# Patient Record
Sex: MALE | Race: OTHER | Employment: OTHER | URBAN - METROPOLITAN AREA
[De-identification: names, ages, dates, MRNs, and addresses within clinical notes are randomized per-mention and may not be internally consistent; named-entity substitution may affect disease eponyms.]

---

## 2018-11-20 ENCOUNTER — APPOINTMENT (OUTPATIENT)
Dept: CT IMAGING | Facility: HOSPITAL | Age: 28
End: 2018-11-20
Attending: EMERGENCY MEDICINE
Payer: MEDICAID

## 2018-11-20 ENCOUNTER — HOSPITAL ENCOUNTER (EMERGENCY)
Facility: HOSPITAL | Age: 28
Discharge: HOME | End: 2018-11-20
Attending: EMERGENCY MEDICINE
Payer: MEDICAID

## 2018-11-20 VITALS
BODY MASS INDEX: 21.97 KG/M2 | HEIGHT: 67 IN | WEIGHT: 140 LBS | TEMPERATURE: 98 F | OXYGEN SATURATION: 100 % | HEART RATE: 90 BPM | SYSTOLIC BLOOD PRESSURE: 132 MMHG | RESPIRATION RATE: 16 BRPM | DIASTOLIC BLOOD PRESSURE: 81 MMHG

## 2018-11-20 DIAGNOSIS — R51.9 NONINTRACTABLE HEADACHE, UNSPECIFIED CHRONICITY PATTERN, UNSPECIFIED HEADACHE TYPE: Primary | ICD-10-CM

## 2018-11-20 PROCEDURE — 70450 CT HEAD/BRAIN W/O DYE: CPT | Performed by: EMERGENCY MEDICINE

## 2018-11-20 PROCEDURE — 99284 EMERGENCY DEPT VISIT MOD MDM: CPT

## 2018-11-20 RX ORDER — ONDANSETRON 4 MG/1
4 TABLET, FILM COATED ORAL ONCE
Status: COMPLETED | OUTPATIENT
Start: 2018-11-20 | End: 2018-11-20

## 2018-11-20 RX ORDER — HYDROCODONE BITARTRATE AND ACETAMINOPHEN 5; 325 MG/1; MG/1
1 TABLET ORAL ONCE
Status: COMPLETED | OUTPATIENT
Start: 2018-11-20 | End: 2018-11-20

## 2018-11-21 NOTE — ED NOTES
Receiving facility notified of impending transfer. Spoke with [facility] DIANNE ZAMORA.  Awaiting [facility] arrival.

## 2018-11-21 NOTE — ED INITIAL ASSESSMENT (HPI)
Pt presents with headache and twitching, states [facility] would not give him pain medication, pt called EMS himself. States had recent flat bone sx at [facility].
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
## 2019-03-23 ENCOUNTER — HOSPITAL ENCOUNTER (EMERGENCY)
Facility: HOSPITAL | Age: 29
Discharge: HOME | End: 2019-03-23
Attending: EMERGENCY MEDICINE
Payer: MEDICAID

## 2019-03-23 VITALS
OXYGEN SATURATION: 96 % | SYSTOLIC BLOOD PRESSURE: 118 MMHG | HEIGHT: 68 IN | HEART RATE: 93 BPM | TEMPERATURE: 99 F | RESPIRATION RATE: 14 BRPM | BODY MASS INDEX: 30.31 KG/M2 | DIASTOLIC BLOOD PRESSURE: 97 MMHG | WEIGHT: 200 LBS

## 2019-03-23 DIAGNOSIS — F32.A DEPRESSION, UNSPECIFIED DEPRESSION TYPE: Primary | ICD-10-CM

## 2019-03-23 LAB
ANION GAP SERPL CALC-SCNC: 5 MMOL/L (ref 0–18)
BUN BLD-MCNC: 16 MG/DL (ref 7–18)
BUN/CREAT SERPL: 14.5 (ref 10–20)
CALCIUM BLD-MCNC: 9.5 MG/DL (ref 8.5–10.1)
CHLORIDE SERPL-SCNC: 109 MMOL/L (ref 98–107)
CO2 SERPL-SCNC: 26 MMOL/L (ref 21–32)
CREAT BLD-MCNC: 1.1 MG/DL (ref 0.7–1.3)
ETHANOL SERPL-MCNC: <3 MG/DL (ref ?–3)
GLUCOSE BLD-MCNC: 97 MG/DL (ref 70–99)
OSMOLALITY SERPL CALC.SUM OF ELEC: 291 MOSM/KG (ref 275–295)
POTASSIUM SERPL-SCNC: 4.6 MMOL/L (ref 3.5–5.1)
SODIUM SERPL-SCNC: 140 MMOL/L (ref 136–145)

## 2019-03-23 PROCEDURE — 80048 BASIC METABOLIC PNL TOTAL CA: CPT | Performed by: EMERGENCY MEDICINE

## 2019-03-23 PROCEDURE — 99285 EMERGENCY DEPT VISIT HI MDM: CPT

## 2019-03-23 PROCEDURE — 85025 COMPLETE CBC W/AUTO DIFF WBC: CPT | Performed by: EMERGENCY MEDICINE

## 2019-03-23 PROCEDURE — 80320 DRUG SCREEN QUANTALCOHOLS: CPT | Performed by: EMERGENCY MEDICINE

## 2019-03-23 PROCEDURE — 36415 COLL VENOUS BLD VENIPUNCTURE: CPT

## 2019-03-23 PROCEDURE — 85060 BLOOD SMEAR INTERPRETATION: CPT | Performed by: EMERGENCY MEDICINE

## 2019-03-24 ENCOUNTER — HOSPITAL ENCOUNTER (EMERGENCY)
Facility: HOSPITAL | Age: 29
Discharge: NURSING, REHAB OR LONG-TERM CARE | End: 2019-03-24
Attending: EMERGENCY MEDICINE
Payer: MEDICAID

## 2019-03-24 VITALS
WEIGHT: 200 LBS | TEMPERATURE: 98 F | BODY MASS INDEX: 31.39 KG/M2 | HEART RATE: 92 BPM | DIASTOLIC BLOOD PRESSURE: 92 MMHG | HEIGHT: 67 IN | RESPIRATION RATE: 22 BRPM | OXYGEN SATURATION: 96 % | SYSTOLIC BLOOD PRESSURE: 107 MMHG

## 2019-03-24 DIAGNOSIS — F32.A DEPRESSION, UNSPECIFIED DEPRESSION TYPE: Primary | ICD-10-CM

## 2019-03-24 PROCEDURE — 99285 EMERGENCY DEPT VISIT HI MDM: CPT

## 2019-03-24 RX ORDER — ECHINACEA PURPUREA EXTRACT 125 MG
1 TABLET ORAL
Status: DISCONTINUED | OUTPATIENT
Start: 2019-03-24 | End: 2019-03-24

## 2019-03-24 RX ORDER — ACETAMINOPHEN 325 MG/1
TABLET ORAL
Status: COMPLETED
Start: 2019-03-24 | End: 2019-03-24

## 2019-03-24 RX ORDER — ACETAMINOPHEN 325 MG/1
650 TABLET ORAL ONCE
Status: COMPLETED | OUTPATIENT
Start: 2019-03-24 | End: 2019-03-24
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
## 2019-03-24 NOTE — ED NOTES
Pt stated that he stayed outside of the n.h. For hours. Pt denied SI. Pt sts that the reason he said that he will cut his wrists was that they kept asking about the plan. Pt denied that he was going to act on what he said.

## 2019-03-24 NOTE — ED NOTES
Pt stated tat some of the CNAs are very rude. Pt stated that some of the CNAs tend to place his food according the clock while others just throw the food on the table. Pt also said that he only at at 1 o'clock.  Pt stated that they don't even wake him up if

## 2019-03-24 NOTE — ED NOTES
BLANKET LEFT IN ED 36.   BELONGINGS LABELED AND SECURITY NOTIFIED FOR .  DR. Andriy Root REPORT NOW AVAILABLE ON EMR, FAXED TO [address] [place] [address] St [phone] PER NH RN REQUEST

## 2019-03-24 NOTE — ED PROVIDER NOTES
Patient Seen in: [facility] AND [facility] Emergency Department    History   Patient presents with:  Eval-P (psychiatric)      HPI    Patient presents to the ED stating that he has had suicidal thoughts with occasional thoughts of cutting himself with a razor. No respiratory distress. Neurological: He is alert and oriented to person, place, and time. Skin: Skin is warm and dry. He is not diaphoretic. Psychiatric: He has a normal mood and affect.  His behavior is normal.   No active suicidal ideation   Nursi any recent pertinent discharge summaries, testing, and procedures and reviewed those reports. Complicating Factors: The patient already has does not have a problem list on file. to contribute to the complexity of this ED evaluation. ED Course:  The pa

## 2019-03-24 NOTE — ED PROVIDER NOTES
Patient Seen in: [facility] AND [facility] Emergency Department    History   Patient presents with:  Checkup    Stated Complaint:     HPI    32 yo male from [address]. He was seen earlier in the day after he called 911 with suicidal ideation.  He says that he did not Constitutional: He is oriented to person, place, and time. He appears well-developed and well-nourished. No distress. HENT:   Head: Normocephalic and atraumatic.    Mouth/Throat: Oropharynx is clear and moist.   Cranial deformity   Neck: Normal range of

## 2019-03-24 NOTE — ED NOTES
Spoke with [facility] personnel regarding pt. They were questioning the case workers name who cleared the pt. It was explained to this person that the pt was cleared by the ED physician to be discharged back to [facility].  Person stated \"ok\" and the conversation was

## 2019-03-24 NOTE — ED NOTES
Discharge instructions given to superior and patient. Patient to return to [facility]. Patient was cleared by psychiatrist.  Ambulance form provided to [facility].

## 2019-03-24 NOTE — ED NOTES
Patient had a bowel movement. Patient cleaned, pad changed and disposable brief changed.   Water given per patient request.  Patient is requesting nasal spray or nasal saline for chronic dry nose

## 2019-03-24 NOTE — ED NOTES
Patient sleeping at this time. No distress. Respirations are equal/nonlabored/spontaneous/regular rate and rhythm.   Will continue to monitor and wait for Psychiatric/LSW consult

## 2019-03-24 NOTE — ED PROVIDER NOTES
Patient seen by psychiatry Dr. Mellisa Aquino and cleared to return to nursing home. He is medically clear for return as well.

## 2019-03-24 NOTE — ED NOTES
Spoke with Andres Maldonado from [facility]. Advised that pt is not suicidal at this time and is being discharge back to [facility].

## 2019-03-24 NOTE — CM/SW NOTE
JUAN DANIEL dispatcher David patria stating [facility] is refusing to take patient back into facility. Spoke with DIANNE Pablo at [facility] and informed her Dr. Du Santoro has discharged patient and he is safe to return to [facility].   Per Pina Esteban she needs to speak with Cullen How

## 2019-03-24 NOTE — BH LEVEL OF CARE ASSESSMENT
Level of Care Assessment Note    General Questions  Why are you here?: Pt presents to ED after making suicidal statements to staff at his nursing home.    Precipitating Events: Pt made suicidal statements to staff at his nursing home and was brought to the details of how to kill yourself? (past 30 days): No  5b. Do you intend to carry out this plan? (past 30 days): No  6. Have you ever done anything, started to do anything, or prepared to do anything to end your life? (lifetime): No  7.  How long ago did you Behaviors: No    Mental Health Symptoms  Hallucination Type: No problems reported or observed  Delusions: No problems reported or observed  Depression Symptoms: Appetite change;Sleep disturbance  Depression Description: Pt reports issues with sleep and pato living environment a supportive place for recovery?: Yes  Describe: Pt lives in a SNF     Withdrawal Symptoms  History of Withdrawal Symptoms: Denies past symptoms  Last Withdrawal Episode: N/A  Current Withdrawal Symptoms: No    Compulsive Behaviors  Are Good  Judgment: Good  Thought Patterns  Clarity/Relevance: Coherent  Flow: Organized  Content: Ordinary  Level of Consciousness: Alert  Level of Consciousness: Alert  Behavior  Exhibited behavior: Participated; Appropriate to situation    Assessment Summary Treatment: No  Transferred: No    Primary Psychiatric Diagnosis  Depressive Disorders: Unspecified Depressive Disorder

## 2019-03-24 NOTE — ED NOTES
After 20 minutes on hold with NH,  Dr. Magdalena Smith spoke with charge nurse  Mirza Brooks at [facility] and informed that patient is cleared to return to [facility]. This RN gave [facility] nurse to nurse report,  Mirza Brooks verbalized understanding and denies any further questions.   Supe

## 2019-03-24 NOTE — ED INITIAL ASSESSMENT (HPI)
Pt was discharged recently form the ER. PT initially was brought in for SI, though pt was denying it. Pt said that he was feeling down and depressed. Pt is paralyzed in left side. Pt is blind.   Pt stated that when he returned back to [facility], they didn't want

## 2019-03-24 NOTE — CONSULTS
[facility]    PATIENT'S NAME: Veronika Joyce   ATTENDING PHYSICIAN: Pop Kerr MD   CONSULTING PHYSICIAN: Minerva Olivera MD   PATIENT ACCOUNT#:   [id number]    LOCATION:  [place] [address]  MEDICAL RECORD #:   [id number]       DATE OF physical therapy twice a week and is slowly getting better in baby steps. His brother said he has found a group of eye physicians in [place] that are working with a new technology of trying to connect a camera to the brain to give him sight.   His brother never tried to hurt or kill himself. He said he does not have the guts to go through with it, and that he also loves his self and his family too much. He also notes that, because of his Confucianism beliefs, he would never kill himself.   He said that most o [facility]      INITIAL ASSESSMENT:  This 60-year-old single white male is admitted to our emergency department because of reports of suicidality at the nursing home where he lives. He has told us repeatedly that he has no plans of harming himself.   He fee 12:57:58  [facility] [id number]  [facility]/

## 2019-03-24 NOTE — ED NOTES
Patient given bottle of nasal saline. Administration instructions provided to patient.   ED Charge RN and Bebeto Scott and Dr. Kirk Vaz attempting to arrange for disposition

## 2019-03-24 NOTE — ED NOTES
Spoke with Zenaida Roland, Chief Medical Officer @ [phone] from [id number] [place] [address] at [facility].  Explained to Dr. Mariaa Granaods that we were going to call the psychiatrist out, though this is not something that we do because this is the pt's second visit to the ED in one

## 2019-03-24 NOTE — ED NOTES
Patient pushed call light  ERT to bedside  Informed ERT this RN ordered nasal saline, however not stocked in ED and will need to come from inpatient pharmacy.
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
## 2019-03-25 LAB
BASOPHILS # BLD AUTO: 0.07 X10(3) UL (ref 0–0.2)
BASOPHILS NFR BLD AUTO: 0.7 %
DEPRECATED RDW RBC AUTO: 58.2 FL (ref 35.1–46.3)
EOSINOPHIL # BLD AUTO: 0.35 X10(3) UL (ref 0–0.7)
EOSINOPHIL NFR BLD AUTO: 3.4 %
ERYTHROCYTE [DISTWIDTH] IN BLOOD BY AUTOMATED COUNT: 24.8 % (ref 11–15)
HCT VFR BLD AUTO: 43.8 % (ref 39–53)
HGB BLD-MCNC: 13 G/DL (ref 13–17.5)
IMM GRANULOCYTES # BLD AUTO: 0.09 X10(3) UL (ref 0–1)
IMM GRANULOCYTES NFR BLD: 0.9 %
LYMPHOCYTES # BLD AUTO: 2.85 X10(3) UL (ref 1–4)
LYMPHOCYTES NFR BLD AUTO: 28.1 %
MCH RBC QN AUTO: 20.5 PG (ref 26–34)
MCHC RBC AUTO-ENTMCNC: 29.7 G/DL (ref 31–37)
MCV RBC AUTO: 69 FL (ref 80–100)
MONOCYTES # BLD AUTO: 0.57 X10(3) UL (ref 0.1–1)
MONOCYTES NFR BLD AUTO: 5.6 %
NEUTROPHILS # BLD AUTO: 6.22 X10 (3) UL (ref 1.5–7.7)
NEUTROPHILS # BLD AUTO: 6.22 X10(3) UL (ref 1.5–7.7)
NEUTROPHILS NFR BLD AUTO: 61.3 %
PLATELET # BLD AUTO: 286 10(3)UL (ref 150–450)
PLATELET MORPHOLOGY: NORMAL
RBC # BLD AUTO: 6.35 X10(6)UL (ref 4.3–5.7)
WBC # BLD AUTO: 10.2 X10(3) UL (ref 4–11)

## 2019-05-12 ENCOUNTER — HOSPITAL ENCOUNTER (EMERGENCY)
Facility: HOSPITAL | Age: 29
Discharge: HOME | End: 2019-05-13
Attending: EMERGENCY MEDICINE
Payer: MEDICAID

## 2019-05-12 VITALS
OXYGEN SATURATION: 95 % | WEIGHT: 219 LBS | HEART RATE: 84 BPM | BODY MASS INDEX: 34.37 KG/M2 | HEIGHT: 67 IN | SYSTOLIC BLOOD PRESSURE: 133 MMHG | DIASTOLIC BLOOD PRESSURE: 87 MMHG | RESPIRATION RATE: 20 BRPM | TEMPERATURE: 98 F

## 2019-05-12 DIAGNOSIS — R10.13 DYSPEPSIA: Primary | ICD-10-CM

## 2019-05-12 PROCEDURE — 80048 BASIC METABOLIC PNL TOTAL CA: CPT

## 2019-05-12 PROCEDURE — 99284 EMERGENCY DEPT VISIT MOD MDM: CPT

## 2019-05-12 PROCEDURE — C9113 INJ PANTOPRAZOLE SODIUM, VIA: HCPCS | Performed by: EMERGENCY MEDICINE

## 2019-05-12 PROCEDURE — 85025 COMPLETE CBC W/AUTO DIFF WBC: CPT | Performed by: EMERGENCY MEDICINE

## 2019-05-12 PROCEDURE — 80076 HEPATIC FUNCTION PANEL: CPT | Performed by: EMERGENCY MEDICINE

## 2019-05-12 PROCEDURE — 96374 THER/PROPH/DIAG INJ IV PUSH: CPT

## 2019-05-12 PROCEDURE — 83690 ASSAY OF LIPASE: CPT | Performed by: EMERGENCY MEDICINE

## 2019-05-12 PROCEDURE — 85025 COMPLETE CBC W/AUTO DIFF WBC: CPT

## 2019-05-12 PROCEDURE — 80048 BASIC METABOLIC PNL TOTAL CA: CPT | Performed by: EMERGENCY MEDICINE

## 2019-05-12 RX ORDER — METHYLPHENIDATE HYDROCHLORIDE 10 MG/1
10 TABLET ORAL DAILY
COMMUNITY
End: 2020-05-16

## 2019-05-12 RX ORDER — MAGNESIUM HYDROXIDE/ALUMINUM HYDROXICE/SIMETHICONE 120; 1200; 1200 MG/30ML; MG/30ML; MG/30ML
30 SUSPENSION ORAL ONCE
Status: COMPLETED | OUTPATIENT
Start: 2019-05-12 | End: 2019-05-12

## 2019-05-12 RX ORDER — GABAPENTIN 600 MG/1
600 TABLET ORAL EVERY 8 HOURS
COMMUNITY
End: 2020-05-16

## 2019-05-12 RX ORDER — SENNA AND DOCUSATE SODIUM 50; 8.6 MG/1; MG/1
1 TABLET, FILM COATED ORAL 2 TIMES DAILY
COMMUNITY
End: 2020-05-16

## 2019-05-12 RX ORDER — BACLOFEN 10 MG/1
10 TABLET ORAL 3 TIMES DAILY
COMMUNITY
End: 2020-05-16

## 2019-05-12 RX ORDER — BISACODYL 10 MG
10 SUPPOSITORY, RECTAL RECTAL DAILY PRN
COMMUNITY
End: 2020-05-16

## 2019-05-12 RX ORDER — AZELASTINE 1 MG/ML
2 SPRAY, METERED NASAL 2 TIMES DAILY
COMMUNITY
End: 2020-05-16

## 2019-05-12 RX ORDER — ONDANSETRON 4 MG/1
4 TABLET, ORALLY DISINTEGRATING ORAL EVERY 8 HOURS PRN
COMMUNITY
End: 2020-05-16

## 2019-05-12 RX ORDER — DIPHENHYDRAMINE HCL 25 MG
12.5 CAPSULE ORAL NIGHTLY PRN
COMMUNITY
End: 2020-05-16

## 2019-05-12 RX ORDER — MAGNESIUM OXIDE 400 MG (241.3 MG MAGNESIUM) TABLET
400 TABLET DAILY
COMMUNITY
End: 2020-05-16

## 2019-05-12 RX ORDER — SERTRALINE HYDROCHLORIDE 25 MG/1
200 TABLET, FILM COATED ORAL DAILY
COMMUNITY
End: 2020-05-16

## 2019-05-12 RX ORDER — ENOXAPARIN SODIUM 100 MG/ML
40 INJECTION SUBCUTANEOUS DAILY
COMMUNITY
End: 2020-05-16

## 2019-05-12 RX ORDER — LEVETIRACETAM 500 MG/1
500 TABLET ORAL 2 TIMES DAILY
COMMUNITY
End: 2020-05-16

## 2019-05-12 RX ORDER — NICOTINE 21 MG/24HR
1 PATCH, TRANSDERMAL 24 HOURS TRANSDERMAL EVERY 24 HOURS
COMMUNITY
End: 2020-05-16

## 2019-05-12 RX ORDER — ACETAMINOPHEN 325 MG/1
650 TABLET ORAL EVERY 4 HOURS PRN
COMMUNITY
End: 2020-05-16

## 2019-05-12 RX ORDER — HYDROCODONE BITARTRATE AND ACETAMINOPHEN 5; 325 MG/1; MG/1
1 TABLET ORAL EVERY 6 HOURS PRN
COMMUNITY
End: 2020-05-16

## 2019-05-12 RX ORDER — PHENOL 1.4 %
10 AEROSOL, SPRAY (ML) MUCOUS MEMBRANE EVERY EVENING
COMMUNITY
End: 2020-05-16

## 2019-05-12 RX ORDER — ARIPIPRAZOLE 5 MG/1
5 TABLET ORAL DAILY
COMMUNITY
End: 2020-05-16

## 2019-05-12 RX ORDER — MELATONIN
325 2 TIMES DAILY WITH MEALS
COMMUNITY
End: 2020-05-16

## 2019-05-12 RX ORDER — TRAZODONE HYDROCHLORIDE 100 MG/1
100 TABLET ORAL NIGHTLY
COMMUNITY
End: 2020-05-16

## 2019-05-13 NOTE — ED PROVIDER NOTES
Patient Seen in: [facility] AND [facility] Emergency Department    History   Patient presents with:  Epigastric Pain    Stated Complaint: Complaints of heartburn    HPI    69-year-old male resident of [address] [place] [address] with history of encephalomyelitis and hemiplegia who rep reactive to light. Neck: Normal range of motion. Neck supple. No stridor. No JVD. Cardiovascular: Normal rate, regular rhythm and intact distal pulses. Pulmonary/Chest: Effort normal. No respiratory distress.   Clear and equal breath sounds bilatera RAINBOW DRAW LIGHT GREEN   RAINBOW DRAW GOLD                MDM   After the GI cocktail Protonix patient states his symptoms have resolved. His vitals are stable. He states he would like to go back to his long-term care facility.   Labs are pending joe mathew

## 2019-05-13 NOTE — ED NOTES
Pt states he takes Norco for Pain control at [facility], but patient wants something more for pain control.

## 2019-05-19 ENCOUNTER — HOSPITAL ENCOUNTER (EMERGENCY)
Facility: HOSPITAL | Age: 29
Discharge: HOME | End: 2019-05-19
Attending: EMERGENCY MEDICINE
Payer: MEDICAID

## 2019-05-19 VITALS
RESPIRATION RATE: 16 BRPM | HEART RATE: 88 BPM | WEIGHT: 215 LBS | DIASTOLIC BLOOD PRESSURE: 74 MMHG | BODY MASS INDEX: 33.74 KG/M2 | SYSTOLIC BLOOD PRESSURE: 112 MMHG | TEMPERATURE: 98 F | HEIGHT: 67 IN | OXYGEN SATURATION: 96 %

## 2019-05-19 DIAGNOSIS — J06.9 VIRAL UPPER RESPIRATORY TRACT INFECTION: Primary | ICD-10-CM

## 2019-05-19 PROCEDURE — 99284 EMERGENCY DEPT VISIT MOD MDM: CPT

## 2019-05-19 RX ORDER — LORATADINE 10 MG/1
10 TABLET ORAL DAILY
Qty: 30 TABLET | Refills: 0 | Status: SHIPPED | OUTPATIENT
Start: 2019-05-19 | End: 2019-06-18

## 2019-05-19 RX ORDER — ONDANSETRON 4 MG/1
4 TABLET, FILM COATED ORAL ONCE
Status: COMPLETED | OUTPATIENT
Start: 2019-05-19 | End: 2019-05-19

## 2019-05-19 RX ORDER — DIPHENHYDRAMINE HCL 25 MG
50 CAPSULE ORAL ONCE
Status: COMPLETED | OUTPATIENT
Start: 2019-05-19 | End: 2019-05-19

## 2019-05-19 RX ORDER — HYDROCODONE BITARTRATE AND ACETAMINOPHEN 5; 325 MG/1; MG/1
1 TABLET ORAL EVERY 6 HOURS PRN
Qty: 12 TABLET | Refills: 0 | Status: SHIPPED | OUTPATIENT
Start: 2019-05-19 | End: 2020-05-16

## 2019-05-19 RX ORDER — HYDROCODONE BITARTRATE AND ACETAMINOPHEN 5; 325 MG/1; MG/1
1 TABLET ORAL ONCE
Status: COMPLETED | OUTPATIENT
Start: 2019-05-19 | End: 2019-05-19
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
## 2019-05-19 NOTE — ED PROVIDER NOTES
Patient Seen in: [facility] AND [facility] Emergency Department    History   Patient presents with:  Headache (neurologic)  Numbness      HPI    Patient presents to the ED complaining of congestion, runny nose and intermittent headache.   He states symptoms are m canals bilaterally   Eyes: Conjunctivae are normal. Right eye exhibits no discharge. Left eye exhibits no discharge. Neck: No tracheal deviation present. Cardiovascular: Normal rate and intact distal pulses.    Pulmonary/Chest: Effort normal. No stridor Plan     Clinical Impression:  Viral upper respiratory tract infection  (primary encounter diagnosis)    Disposition:  Discharge    Follow-up:  Kassandra Cancino  [address] & [facility]  [phone]    Schedule an appointmen

## 2019-06-01 ENCOUNTER — HOSPITAL ENCOUNTER (EMERGENCY)
Facility: HOSPITAL | Age: 29
Discharge: HOME | End: 2019-06-01
Attending: EMERGENCY MEDICINE
Payer: MEDICAID

## 2019-06-01 VITALS
HEIGHT: 67 IN | BODY MASS INDEX: 33.9 KG/M2 | WEIGHT: 216 LBS | RESPIRATION RATE: 16 BRPM | OXYGEN SATURATION: 99 % | HEART RATE: 77 BPM | TEMPERATURE: 99 F | SYSTOLIC BLOOD PRESSURE: 113 MMHG | DIASTOLIC BLOOD PRESSURE: 56 MMHG

## 2019-06-01 DIAGNOSIS — R20.2 PARESTHESIAS: Primary | ICD-10-CM

## 2019-06-01 PROCEDURE — 99283 EMERGENCY DEPT VISIT LOW MDM: CPT

## 2019-06-01 RX ORDER — GABAPENTIN 300 MG/1
300 CAPSULE ORAL ONCE
Status: COMPLETED | OUTPATIENT
Start: 2019-06-01 | End: 2019-06-01

## 2019-06-02 NOTE — ED INITIAL ASSESSMENT (HPI)
Numbness and tingling to left foot for the past week. States he has a sensation of ants crawling on his feet.
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
## 2019-06-06 ENCOUNTER — HOSPITAL ENCOUNTER (EMERGENCY)
Facility: HOSPITAL | Age: 29
Discharge: HOME | End: 2019-06-07
Attending: EMERGENCY MEDICINE
Payer: MEDICAID

## 2019-06-06 VITALS
SYSTOLIC BLOOD PRESSURE: 113 MMHG | RESPIRATION RATE: 20 BRPM | OXYGEN SATURATION: 94 % | WEIGHT: 216.06 LBS | DIASTOLIC BLOOD PRESSURE: 70 MMHG | TEMPERATURE: 98 F | BODY MASS INDEX: 34 KG/M2 | HEART RATE: 96 BPM

## 2019-06-06 DIAGNOSIS — R20.2 PARESTHESIAS: Primary | ICD-10-CM

## 2019-06-06 PROCEDURE — 99283 EMERGENCY DEPT VISIT LOW MDM: CPT

## 2019-06-06 RX ORDER — GABAPENTIN 300 MG/1
300 CAPSULE ORAL ONCE
Status: COMPLETED | OUTPATIENT
Start: 2019-06-06 | End: 2019-06-06

## 2019-06-07 NOTE — ED INITIAL ASSESSMENT (HPI)
Pt from [facility], c/o left foot pain/numbness, states he feels \"ants crawling. \" He was recently here for same problem. CMS intact.
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
## 2019-06-08 ENCOUNTER — HOSPITAL ENCOUNTER (EMERGENCY)
Facility: HOSPITAL | Age: 29
Discharge: NURSING, REHAB OR LONG-TERM CARE | End: 2019-06-09
Attending: EMERGENCY MEDICINE
Payer: MEDICAID

## 2019-06-08 DIAGNOSIS — R20.2 PARESTHESIAS: Primary | ICD-10-CM

## 2019-06-08 PROCEDURE — 99283 EMERGENCY DEPT VISIT LOW MDM: CPT

## 2019-06-09 VITALS
BODY MASS INDEX: 33.74 KG/M2 | SYSTOLIC BLOOD PRESSURE: 121 MMHG | TEMPERATURE: 98 F | OXYGEN SATURATION: 97 % | RESPIRATION RATE: 16 BRPM | WEIGHT: 215 LBS | HEIGHT: 67 IN | DIASTOLIC BLOOD PRESSURE: 71 MMHG | HEART RATE: 100 BPM

## 2019-06-09 PROCEDURE — 94640 AIRWAY INHALATION TREATMENT: CPT

## 2019-06-09 RX ORDER — IPRATROPIUM BROMIDE AND ALBUTEROL SULFATE 2.5; .5 MG/3ML; MG/3ML
3 SOLUTION RESPIRATORY (INHALATION) ONCE
Status: COMPLETED | OUTPATIENT
Start: 2019-06-09 | End: 2019-06-09

## 2019-06-09 RX ORDER — HYDROCODONE BITARTRATE AND ACETAMINOPHEN 5; 325 MG/1; MG/1
2 TABLET ORAL ONCE
Status: COMPLETED | OUTPATIENT
Start: 2019-06-09 | End: 2019-06-09

## 2019-06-09 NOTE — ED INITIAL ASSESSMENT (HPI)
Patient lives at [facility] for TBI, patient alert and oriented. Patient here recently for same c/o left foot pain and numbness. Patient told to f/u with a neurologist with the soonest appt in Aug.  Patient receives PT at the facility but only on his left arm.  +C

## 2019-06-12 ENCOUNTER — HOSPITAL ENCOUNTER (EMERGENCY)
Facility: HOSPITAL | Age: 29
Discharge: HOME | End: 2019-06-12
Attending: EMERGENCY MEDICINE
Payer: MEDICAID

## 2019-06-12 VITALS — OXYGEN SATURATION: 96 %

## 2019-06-12 DIAGNOSIS — R53.1 WEAKNESS GENERALIZED: Primary | ICD-10-CM

## 2019-06-12 PROCEDURE — 94640 AIRWAY INHALATION TREATMENT: CPT

## 2019-06-12 PROCEDURE — 99283 EMERGENCY DEPT VISIT LOW MDM: CPT

## 2019-06-12 PROCEDURE — 36415 COLL VENOUS BLD VENIPUNCTURE: CPT

## 2019-06-12 PROCEDURE — 80048 BASIC METABOLIC PNL TOTAL CA: CPT | Performed by: EMERGENCY MEDICINE

## 2019-06-12 PROCEDURE — 83735 ASSAY OF MAGNESIUM: CPT | Performed by: EMERGENCY MEDICINE

## 2019-06-12 PROCEDURE — 85025 COMPLETE CBC W/AUTO DIFF WBC: CPT | Performed by: EMERGENCY MEDICINE

## 2019-06-12 RX ORDER — ALBUTEROL SULFATE 2.5 MG/3ML
2.5 SOLUTION RESPIRATORY (INHALATION) ONCE
Status: COMPLETED | OUTPATIENT
Start: 2019-06-12 | End: 2019-06-12

## 2019-06-12 RX ORDER — HYDROCODONE BITARTRATE AND ACETAMINOPHEN 5; 325 MG/1; MG/1
1 TABLET ORAL ONCE
Status: DISCONTINUED | OUTPATIENT
Start: 2019-06-12 | End: 2019-06-12
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
## 2019-06-13 NOTE — ED PROVIDER NOTES
Patient Seen in: [facility] AND [facility] Emergency Department    History   Patient presents with:  Lower Extremity Injury (musculoskeletal)    Stated Complaint: Left foot pain    HPI    32 yo male who had TBI and is now resident in a nursing home.  He has been Pulmonary/Chest: Effort normal. No respiratory distress. Abdominal: Soft. He exhibits no distension. There is no tenderness. Neurological: He is alert. Left sided weakness which is the patients baseline.  He c/o tingling but no objective sensory def

## 2019-06-15 ENCOUNTER — APPOINTMENT (OUTPATIENT)
Dept: GENERAL RADIOLOGY | Facility: HOSPITAL | Age: 29
End: 2019-06-15
Attending: EMERGENCY MEDICINE
Payer: MEDICAID

## 2019-06-15 ENCOUNTER — HOSPITAL ENCOUNTER (EMERGENCY)
Facility: HOSPITAL | Age: 29
Discharge: HOME | End: 2019-06-15
Attending: EMERGENCY MEDICINE
Payer: MEDICAID

## 2019-06-15 VITALS
DIASTOLIC BLOOD PRESSURE: 89 MMHG | RESPIRATION RATE: 18 BRPM | BODY MASS INDEX: 34.69 KG/M2 | OXYGEN SATURATION: 95 % | SYSTOLIC BLOOD PRESSURE: 149 MMHG | WEIGHT: 221 LBS | TEMPERATURE: 98 F | HEIGHT: 67 IN | HEART RATE: 96 BPM

## 2019-06-15 VITALS
SYSTOLIC BLOOD PRESSURE: 107 MMHG | WEIGHT: 220 LBS | HEART RATE: 96 BPM | TEMPERATURE: 98 F | BODY MASS INDEX: 34.53 KG/M2 | RESPIRATION RATE: 20 BRPM | HEIGHT: 67 IN | DIASTOLIC BLOOD PRESSURE: 73 MMHG | OXYGEN SATURATION: 94 %

## 2019-06-15 DIAGNOSIS — R53.1 WEAKNESS GENERALIZED: Primary | ICD-10-CM

## 2019-06-15 DIAGNOSIS — R20.2 PARESTHESIAS: Primary | ICD-10-CM

## 2019-06-15 PROCEDURE — 99284 EMERGENCY DEPT VISIT MOD MDM: CPT

## 2019-06-15 PROCEDURE — 96360 HYDRATION IV INFUSION INIT: CPT

## 2019-06-15 PROCEDURE — 71045 X-RAY EXAM CHEST 1 VIEW: CPT | Performed by: EMERGENCY MEDICINE

## 2019-06-15 PROCEDURE — 93010 ELECTROCARDIOGRAM REPORT: CPT | Performed by: EMERGENCY MEDICINE

## 2019-06-15 PROCEDURE — 85025 COMPLETE CBC W/AUTO DIFF WBC: CPT | Performed by: EMERGENCY MEDICINE

## 2019-06-15 PROCEDURE — 80048 BASIC METABOLIC PNL TOTAL CA: CPT | Performed by: EMERGENCY MEDICINE

## 2019-06-15 PROCEDURE — 96361 HYDRATE IV INFUSION ADD-ON: CPT

## 2019-06-15 PROCEDURE — 93005 ELECTROCARDIOGRAM TRACING: CPT

## 2019-06-15 PROCEDURE — 94640 AIRWAY INHALATION TREATMENT: CPT

## 2019-06-15 RX ORDER — IPRATROPIUM BROMIDE AND ALBUTEROL SULFATE 2.5; .5 MG/3ML; MG/3ML
3 SOLUTION RESPIRATORY (INHALATION) ONCE
Status: COMPLETED | OUTPATIENT
Start: 2019-06-15 | End: 2019-06-15

## 2019-06-15 RX ORDER — IBUPROFEN 600 MG/1
600 TABLET ORAL ONCE
Status: COMPLETED | OUTPATIENT
Start: 2019-06-15 | End: 2019-06-15

## 2019-06-15 RX ORDER — IBUPROFEN 600 MG/1
600 TABLET ORAL EVERY 8 HOURS PRN
Qty: 30 TABLET | Refills: 0 | Status: SHIPPED | OUTPATIENT
Start: 2019-06-15 | End: 2019-06-22

## 2019-06-15 NOTE — ED PROVIDER NOTES
Patient Seen in: [facility] AND [facility] Emergency Department    History   Patient presents with:  Numbness Weakness (neurologic)    Stated Complaint: weakness    HPI    69-year-old male with history of TBI, muscle weakness, here with generalized weakness for weakness  Other systems are as noted in HPI. Constitutional and vital signs reviewed. All other systems reviewed and negative except as noted above.     Physical Exam     ED Triage Vitals [06/15/19 0042]   /83   Pulse 107   Resp 13   Temp 98.1 ° mg/dL    BUN/CREA Ratio 13.9 10.0 - 20.0    Calcium, Total 9.2 8.5 - 10.1 mg/dL    Calculated Osmolality 292 275 - 295 mOsm/kg    GFR, Non- 86 >=60    GFR, -American 99 >=60   CBC W/ DIFFERENTIAL    Collection Time: 06/15/19  1:05 AM afebrile, hemodynamically stable  - I ordered and personally reviewed the labs and imaging and found no leukocytosis, no anemia, electrolytes reassuring  - fluids ordered  - labs and XR reassuring - supportive care discussed      Medical Record Review: I p

## 2019-06-16 NOTE — ED PROVIDER NOTES
Patient Seen in: [facility] AND [facility] Emergency Department    History   Patient presents with:  Musculoskeletal Problem    Stated Complaint: L foot pain    HPI    Patient is a 19-year-old male who arrives from [place] via EMS for tingling in his left foot.   He Normal sensation    ED Course   Labs Reviewed - No data to display       MDM   Pt given ibuprofen for pain. He is requesting duoneb prior to discharge back to [facility]. He is at his baseline.               Disposition and Plan     Clinical Impression:  Stef

## 2019-06-16 NOTE — ED NOTES
Pt educated on diagnosis and medications, states that he needs an order from us so that [facility] would give it to him.  Patient informed that he needs to see his PCP or whoever writes the orders for him at [facility] in order to get any additional mackenzie

## 2019-06-17 ENCOUNTER — HOSPITAL ENCOUNTER (EMERGENCY)
Facility: HOSPITAL | Age: 29
Discharge: HOME | End: 2019-06-17
Attending: EMERGENCY MEDICINE
Payer: MEDICAID

## 2019-06-17 VITALS
TEMPERATURE: 99 F | DIASTOLIC BLOOD PRESSURE: 84 MMHG | OXYGEN SATURATION: 96 % | SYSTOLIC BLOOD PRESSURE: 119 MMHG | HEART RATE: 97 BPM | RESPIRATION RATE: 18 BRPM | WEIGHT: 220 LBS | BODY MASS INDEX: 34 KG/M2

## 2019-06-17 DIAGNOSIS — H57.89 IRRITATION OF RIGHT EYE: Primary | ICD-10-CM

## 2019-06-17 PROCEDURE — 99283 EMERGENCY DEPT VISIT LOW MDM: CPT

## 2019-06-17 PROCEDURE — 94640 AIRWAY INHALATION TREATMENT: CPT

## 2019-06-17 RX ORDER — TOBRAMYCIN 3 MG/ML
2 SOLUTION/ DROPS OPHTHALMIC ONCE
Status: COMPLETED | OUTPATIENT
Start: 2019-06-17 | End: 2019-06-17

## 2019-06-17 RX ORDER — IPRATROPIUM BROMIDE AND ALBUTEROL SULFATE 2.5; .5 MG/3ML; MG/3ML
3 SOLUTION RESPIRATORY (INHALATION) ONCE
Status: COMPLETED | OUTPATIENT
Start: 2019-06-17 | End: 2019-06-17

## 2019-06-17 NOTE — ED INITIAL ASSESSMENT (HPI)
Patient here with a burning sensation and a headache \"from the irritation in his eye. Patient has a prosthetic eye on the right side. States the nurse at [facility] would not give him his tobramycin drops so he came here.

## 2019-06-17 NOTE — ED PROVIDER NOTES
Patient Seen in: [facility] AND [facility] Emergency Department    History   Patient presents with:  Eye Pain    Stated Complaint: Eye pain    HPI    Patient is here with complaint of pain to the right eye.   He states he has chronic irritation to the eye and the Nasal route 2 (two) times daily. baclofen 10 MG Oral Tab,  Take 10 mg by mouth 3 (three) times daily. diphenhydrAMINE HCl 25 MG Oral Cap,  Take 12.5 mg by mouth nightly as needed for Itching.    bisacodyl 10 MG Rectal Suppos,  Place 10 mg rectally daily nourished adult lying in bed in no distress. Vital signs noted. HEENT: There is visible abnormality of the appearance of the skull consistent with previous traumatic brain injury. Eye:  No scleral icterus. Eyelids appear normal, no lesions.   The right

## 2019-06-21 ENCOUNTER — HOSPITAL ENCOUNTER (EMERGENCY)
Facility: HOSPITAL | Age: 29
Discharge: HOME | End: 2019-06-21
Attending: EMERGENCY MEDICINE
Payer: MEDICAID

## 2019-06-21 VITALS
DIASTOLIC BLOOD PRESSURE: 78 MMHG | WEIGHT: 220 LBS | RESPIRATION RATE: 18 BRPM | OXYGEN SATURATION: 96 % | HEART RATE: 100 BPM | BODY MASS INDEX: 34 KG/M2 | TEMPERATURE: 98 F | SYSTOLIC BLOOD PRESSURE: 129 MMHG

## 2019-06-21 DIAGNOSIS — H57.89 IRRITATION OF BOTH EYES: Primary | ICD-10-CM

## 2019-06-21 PROCEDURE — 99283 EMERGENCY DEPT VISIT LOW MDM: CPT

## 2019-06-21 RX ORDER — OLOPATADINE HYDROCHLORIDE 1 MG/ML
1 SOLUTION/ DROPS OPHTHALMIC 2 TIMES DAILY
Qty: 5 ML | Refills: 0 | Status: SHIPPED | OUTPATIENT
Start: 2019-06-21 | End: 2019-06-28

## 2019-06-21 NOTE — ED NOTES
Report to [address] [place] [address] Drive at [facility], awaiting [facility] transport back to [facility]

## 2019-06-21 NOTE — ED PROVIDER NOTES
Patient Seen in: [facility] AND [facility] Emergency Department    History   Patient presents with:   Eye Visual Problem (opthalmic)    Stated Complaint:     HPI    Patient is a 23-year-old male from nursing home who arrives by ambulance for bilateral eye irritat swelling. No f/b visualized  SKIN: warm and dry  NEURO: normal speech, oriented. No focal deficit  NECK: supple, nontender    ED Course   Labs Reviewed - No data to display      MDM   Patient given saline eyedrops in the emergency department.   We will also

## 2019-06-21 NOTE — ED NOTES
33year old male here with eye irritation wants eye drops, from NH, here 8 times this month, hx of TBI and bedbound

## 2019-06-24 ENCOUNTER — HOSPITAL ENCOUNTER (EMERGENCY)
Facility: HOSPITAL | Age: 29
Discharge: HOME | End: 2019-06-24
Attending: EMERGENCY MEDICINE
Payer: MEDICAID

## 2019-06-24 ENCOUNTER — APPOINTMENT (OUTPATIENT)
Dept: CT IMAGING | Facility: HOSPITAL | Age: 29
End: 2019-06-24
Attending: EMERGENCY MEDICINE
Payer: MEDICAID

## 2019-06-24 VITALS
HEIGHT: 67 IN | WEIGHT: 220 LBS | BODY MASS INDEX: 34.53 KG/M2 | DIASTOLIC BLOOD PRESSURE: 65 MMHG | RESPIRATION RATE: 25 BRPM | HEART RATE: 95 BPM | TEMPERATURE: 98 F | OXYGEN SATURATION: 99 % | SYSTOLIC BLOOD PRESSURE: 118 MMHG

## 2019-06-24 DIAGNOSIS — R11.0 NAUSEA: Primary | ICD-10-CM

## 2019-06-24 PROCEDURE — 70450 CT HEAD/BRAIN W/O DYE: CPT | Performed by: EMERGENCY MEDICINE

## 2019-06-24 PROCEDURE — 99284 EMERGENCY DEPT VISIT MOD MDM: CPT

## 2019-06-24 RX ORDER — ONDANSETRON 4 MG/1
4 TABLET, ORALLY DISINTEGRATING ORAL EVERY 8 HOURS PRN
Qty: 15 TABLET | Refills: 0 | Status: SHIPPED | OUTPATIENT
Start: 2019-06-24 | End: 2019-06-29

## 2019-06-24 RX ORDER — ONDANSETRON 4 MG/1
4 TABLET, ORALLY DISINTEGRATING ORAL ONCE
Status: COMPLETED | OUTPATIENT
Start: 2019-06-24 | End: 2019-06-24

## 2019-06-25 NOTE — ED INITIAL ASSESSMENT (HPI)
Pt brought in by EMS for complaints of weakness to the right side and vomiting x 1 that started yesterday, per pt weakness is a lot better now, no vomiting today.
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
## 2019-07-02 ENCOUNTER — HOSPITAL ENCOUNTER (EMERGENCY)
Facility: HOSPITAL | Age: 29
Discharge: HOME | End: 2019-07-02
Attending: EMERGENCY MEDICINE
Payer: MEDICAID

## 2019-07-02 VITALS
DIASTOLIC BLOOD PRESSURE: 73 MMHG | SYSTOLIC BLOOD PRESSURE: 129 MMHG | OXYGEN SATURATION: 97 % | HEART RATE: 90 BPM | HEIGHT: 67 IN | WEIGHT: 219 LBS | BODY MASS INDEX: 34.37 KG/M2 | TEMPERATURE: 98 F | RESPIRATION RATE: 16 BRPM

## 2019-07-02 DIAGNOSIS — T67.9XXA HEAT EXPOSURE, INITIAL ENCOUNTER: ICD-10-CM

## 2019-07-02 DIAGNOSIS — Z00.00 GENERAL MEDICAL EXAM: Primary | ICD-10-CM

## 2019-07-02 LAB
ANION GAP SERPL CALC-SCNC: 7 MMOL/L (ref 0–18)
BASOPHILS # BLD AUTO: 0.09 X10(3) UL (ref 0–0.2)
BASOPHILS NFR BLD AUTO: 0.9 %
BUN BLD-MCNC: 17 MG/DL (ref 7–18)
BUN/CREAT SERPL: 15.2 (ref 10–20)
CALCIUM BLD-MCNC: 9.2 MG/DL (ref 8.5–10.1)
CHLORIDE SERPL-SCNC: 108 MMOL/L (ref 98–112)
CK SERPL-CCNC: 60 U/L (ref 39–308)
CO2 SERPL-SCNC: 26 MMOL/L (ref 21–32)
CREAT BLD-MCNC: 1.12 MG/DL (ref 0.7–1.3)
DEPRECATED RDW RBC AUTO: 50.6 FL (ref 35.1–46.3)
EOSINOPHIL # BLD AUTO: 0.29 X10(3) UL (ref 0–0.7)
EOSINOPHIL NFR BLD AUTO: 2.9 %
ERYTHROCYTE [DISTWIDTH] IN BLOOD BY AUTOMATED COUNT: 19.9 % (ref 11–15)
GLUCOSE BLD-MCNC: 120 MG/DL (ref 70–99)
HCT VFR BLD AUTO: 43.1 % (ref 39–53)
HGB BLD-MCNC: 13.6 G/DL (ref 13–17.5)
IMM GRANULOCYTES # BLD AUTO: 0.13 X10(3) UL (ref 0–1)
IMM GRANULOCYTES NFR BLD: 1.3 %
LYMPHOCYTES # BLD AUTO: 1.94 X10(3) UL (ref 1–4)
LYMPHOCYTES NFR BLD AUTO: 19.2 %
MCH RBC QN AUTO: 23.6 PG (ref 26–34)
MCHC RBC AUTO-ENTMCNC: 31.6 G/DL (ref 31–37)
MCV RBC AUTO: 74.7 FL (ref 80–100)
MONOCYTES # BLD AUTO: 0.55 X10(3) UL (ref 0.1–1)
MONOCYTES NFR BLD AUTO: 5.4 %
NEUTROPHILS # BLD AUTO: 7.13 X10 (3) UL (ref 1.5–7.7)
NEUTROPHILS # BLD AUTO: 7.13 X10(3) UL (ref 1.5–7.7)
NEUTROPHILS NFR BLD AUTO: 70.3 %
OSMOLALITY SERPL CALC.SUM OF ELEC: 295 MOSM/KG (ref 275–295)
PLATELET # BLD AUTO: 233 10(3)UL (ref 150–450)
POTASSIUM SERPL-SCNC: 4.1 MMOL/L (ref 3.5–5.1)
RBC # BLD AUTO: 5.77 X10(6)UL (ref 4.3–5.7)
SODIUM SERPL-SCNC: 141 MMOL/L (ref 136–145)
WBC # BLD AUTO: 10.1 X10(3) UL (ref 4–11)

## 2019-07-02 PROCEDURE — 96360 HYDRATION IV INFUSION INIT: CPT

## 2019-07-02 PROCEDURE — 99284 EMERGENCY DEPT VISIT MOD MDM: CPT

## 2019-07-02 PROCEDURE — 85025 COMPLETE CBC W/AUTO DIFF WBC: CPT | Performed by: EMERGENCY MEDICINE

## 2019-07-02 PROCEDURE — 80048 BASIC METABOLIC PNL TOTAL CA: CPT | Performed by: EMERGENCY MEDICINE

## 2019-07-02 PROCEDURE — 82550 ASSAY OF CK (CPK): CPT | Performed by: EMERGENCY MEDICINE
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
## 2019-07-02 NOTE — CM/SW NOTE
[facility] [phone] left for Ruba VALDIVIA at [facility] about request to transfer to a different facility.

## 2019-07-03 NOTE — ED NOTES
[facility] at the bedside for transport. DC instructions reviewed with the patient, Lalo SOLIS called with updates. Pt's belongings with the patient, pt ready for transport.

## 2019-08-02 NOTE — ED INITIAL ASSESSMENT (HPI)
Jacqui Benedict reports suicidal thoughts without a plan. Reports he sees a psychiatrist once a month at the NH.  Calm and cooperative

## 2019-08-03 NOTE — BH LEVEL OF CARE ASSESSMENT
Level of Care Assessment Note    General Questions  Why are you here?: \"Today I was just going through some stuff in my room. I was feeling down and depressed. I didn't get any help from like 2pm on. \"   Precipitating Events: Pt states that the staff did you had these thoughts and had some intention of acting on them? (past 30 days): No  5a. Have you started to work out or worked out the details of how to kill yourself? (past 30 days): No  5b. Do you intend to carry out this plan? (past 30 days): No  6.  Wale Mays No    Mental Health Symptoms  Hallucination Type: No problems reported or observed  Delusions: No problems reported or observed  Depression Symptoms: Other (Comment)  Depression Description: Pt states that night shift is hard for him because he states that Support for Recovery  Is your living environment a supportive place for recovery?: No  Describe: Pt lives in nursing home      Withdrawal Symptoms  History of Withdrawal Symptoms: Denies past symptoms  Last Withdrawal Episode: N/A  Current Withdrawal S judgment as evidenced by: Pt reported having suicidal ideation   Thought Patterns  Clarity/Relevance: Coherent  Flow: Disorganized  Content: Ordinary  Level of Consciousness: Alert  Level of Consciousness: Alert  Behavior  Exhibited behavior: Appropriate t

## 2019-08-03 NOTE — CM/SW NOTE
Spoke to RN at [address] will go to Room 135-A on the first floor. Report may be called to [phone] option 3.
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
## 2019-08-18 ENCOUNTER — HOSPITAL ENCOUNTER (EMERGENCY)
Facility: HOSPITAL | Age: 29
Discharge: HOME | End: 2019-08-18
Attending: EMERGENCY MEDICINE
Payer: MEDICAID

## 2019-08-18 ENCOUNTER — APPOINTMENT (OUTPATIENT)
Dept: GENERAL RADIOLOGY | Facility: HOSPITAL | Age: 29
End: 2019-08-18
Attending: EMERGENCY MEDICINE
Payer: MEDICAID

## 2019-08-18 VITALS
BODY MASS INDEX: 33.9 KG/M2 | SYSTOLIC BLOOD PRESSURE: 111 MMHG | DIASTOLIC BLOOD PRESSURE: 98 MMHG | HEIGHT: 67 IN | WEIGHT: 216 LBS | HEART RATE: 97 BPM | RESPIRATION RATE: 15 BRPM | TEMPERATURE: 98 F | OXYGEN SATURATION: 95 %

## 2019-08-18 DIAGNOSIS — R07.89 CHEST PAIN, ATYPICAL: Primary | ICD-10-CM

## 2019-08-18 PROCEDURE — 99284 EMERGENCY DEPT VISIT MOD MDM: CPT

## 2019-08-18 PROCEDURE — 93005 ELECTROCARDIOGRAM TRACING: CPT

## 2019-08-18 PROCEDURE — 93010 ELECTROCARDIOGRAM REPORT: CPT | Performed by: EMERGENCY MEDICINE

## 2019-08-18 PROCEDURE — 71046 X-RAY EXAM CHEST 2 VIEWS: CPT | Performed by: EMERGENCY MEDICINE

## 2019-08-18 RX ORDER — ACETAMINOPHEN 500 MG
1000 TABLET ORAL ONCE
Status: COMPLETED | OUTPATIENT
Start: 2019-08-18 | End: 2019-08-18

## 2019-08-18 RX ORDER — LORAZEPAM 1 MG/1
1 TABLET ORAL ONCE
Status: COMPLETED | OUTPATIENT
Start: 2019-08-18 | End: 2019-08-18

## 2019-08-18 NOTE — ED INITIAL ASSESSMENT (HPI)
Pt called EMS for L sided CP/tenderness starting at 0500. Also c/o progressively worsening R sided weakness. .  H/x of L sided hemiplegia.

## 2019-08-18 NOTE — ED NOTES
RN report given to Cat at [facility]. Pt aware of plan to return. [facility] called, ETA 1500.

## 2019-08-18 NOTE — ED PROVIDER NOTES
Patient Seen in: [facility] Emergency Department    History   Patient presents with:  Chest Pain Angina (cardiovascular)    Stated Complaint: L pec pain    HPI    Patient is here with complaint of some pain to the left anterior chest.  He states i needed for Itching. bisacodyl 10 MG Rectal Suppos,  Place 10 mg rectally daily as needed. ferrous sulfate 325 (65 FE) MG Oral Tab EC,  Take 325 mg by mouth 2 (two) times daily with meals.    gabapentin 600 MG Oral Tab,  Take 600 mg by mouth every 8 (eig scleral icterus. Eyelids appear normal, no lesions. Cardiovascular:  Normal S1 and S2, no murmur, regular, with good peripheral perfusion. Respiratory:  Lungs clear to auscultation bilaterally with good effort. No wheezes, ronchi, or rales.   Abdomen:

## 2019-08-27 ENCOUNTER — HOSPITAL ENCOUNTER (EMERGENCY)
Facility: HOSPITAL | Age: 29
Discharge: HOME | End: 2019-08-27
Attending: EMERGENCY MEDICINE
Payer: MEDICAID

## 2019-08-27 VITALS
SYSTOLIC BLOOD PRESSURE: 109 MMHG | RESPIRATION RATE: 24 BRPM | TEMPERATURE: 98 F | HEART RATE: 99 BPM | OXYGEN SATURATION: 100 % | DIASTOLIC BLOOD PRESSURE: 88 MMHG

## 2019-08-27 DIAGNOSIS — R25.1 EPISODE OF SHAKING: Primary | ICD-10-CM

## 2019-08-27 PROCEDURE — 99283 EMERGENCY DEPT VISIT LOW MDM: CPT

## 2019-08-28 NOTE — ED PROVIDER NOTES
Patient Seen in: [facility] AND [facility] Emergency Department    History   Patient presents with: Other    Stated Complaint: well being check    HPI  History is provided by EMS and patient.     59-year-old male with history of ADHD, hemiplegia, seizures, TBI, light-headedness and headaches. All other systems reviewed and are negative. Positive for stated complaint: well being check  Other systems are as noted in HPI. Constitutional and vital signs reviewed.       All other systems reviewed and negative e recent pertinent discharge summaries, testing, and procedures, and reviewed those reports. Complicating Factors: The patient already has does not have a problem list on file. to contribute to the complexity of his ED evaluation.     - pt  comfortable wit

## 2019-08-28 NOTE — ED INITIAL ASSESSMENT (HPI)
Pt stated he had a 20 sec leg spasm earlier today and just wanted to make sure he was okay. No spasms noted at the moment.  Pt a&ox4

## 2019-09-01 ENCOUNTER — HOSPITAL ENCOUNTER (EMERGENCY)
Facility: HOSPITAL | Age: 29
Discharge: HOME | End: 2019-09-01
Attending: EMERGENCY MEDICINE
Payer: MEDICAID

## 2019-09-01 VITALS
TEMPERATURE: 99 F | DIASTOLIC BLOOD PRESSURE: 75 MMHG | HEIGHT: 67 IN | BODY MASS INDEX: 34.21 KG/M2 | SYSTOLIC BLOOD PRESSURE: 113 MMHG | RESPIRATION RATE: 17 BRPM | WEIGHT: 218 LBS | OXYGEN SATURATION: 96 % | HEART RATE: 88 BPM

## 2019-09-01 DIAGNOSIS — R44.3 HALLUCINATIONS: Primary | ICD-10-CM

## 2019-09-01 LAB
ALBUMIN SERPL-MCNC: 3.7 G/DL (ref 3.4–5)
ALP LIVER SERPL-CCNC: 98 U/L (ref 45–117)
ALT SERPL-CCNC: 59 U/L (ref 16–61)
ANION GAP SERPL CALC-SCNC: 8 MMOL/L (ref 0–18)
AST SERPL-CCNC: 31 U/L (ref 15–37)
BASOPHILS # BLD AUTO: 0.09 X10(3) UL (ref 0–0.2)
BASOPHILS NFR BLD AUTO: 0.8 %
BILIRUB DIRECT SERPL-MCNC: <0.1 MG/DL (ref 0–0.2)
BILIRUB SERPL-MCNC: 0.2 MG/DL (ref 0.1–2)
BUN BLD-MCNC: 21 MG/DL (ref 7–18)
BUN/CREAT SERPL: 18.3 (ref 10–20)
CALCIUM BLD-MCNC: 8.9 MG/DL (ref 8.5–10.1)
CHLORIDE SERPL-SCNC: 106 MMOL/L (ref 98–112)
CO2 SERPL-SCNC: 26 MMOL/L (ref 21–32)
CREAT BLD-MCNC: 1.15 MG/DL (ref 0.7–1.3)
DEPRECATED RDW RBC AUTO: 47.5 FL (ref 35.1–46.3)
EOSINOPHIL # BLD AUTO: 0.3 X10(3) UL (ref 0–0.7)
EOSINOPHIL NFR BLD AUTO: 2.8 %
ERYTHROCYTE [DISTWIDTH] IN BLOOD BY AUTOMATED COUNT: 17.2 % (ref 11–15)
GLUCOSE BLD-MCNC: 110 MG/DL (ref 70–99)
HCT VFR BLD AUTO: 43.6 % (ref 39–53)
HGB BLD-MCNC: 13.6 G/DL (ref 13–17.5)
IMM GRANULOCYTES # BLD AUTO: 0.13 X10(3) UL (ref 0–1)
IMM GRANULOCYTES NFR BLD: 1.2 %
LYMPHOCYTES # BLD AUTO: 2.5 X10(3) UL (ref 1–4)
LYMPHOCYTES NFR BLD AUTO: 23.5 %
M PROTEIN MFR SERPL ELPH: 8 G/DL (ref 6.4–8.2)
MCH RBC QN AUTO: 24 PG (ref 26–34)
MCHC RBC AUTO-ENTMCNC: 31.2 G/DL (ref 31–37)
MCV RBC AUTO: 77 FL (ref 80–100)
MONOCYTES # BLD AUTO: 0.62 X10(3) UL (ref 0.1–1)
MONOCYTES NFR BLD AUTO: 5.8 %
NEUTROPHILS # BLD AUTO: 7 X10 (3) UL (ref 1.5–7.7)
NEUTROPHILS # BLD AUTO: 7 X10(3) UL (ref 1.5–7.7)
NEUTROPHILS NFR BLD AUTO: 65.9 %
OSMOLALITY SERPL CALC.SUM OF ELEC: 294 MOSM/KG (ref 275–295)
PLATELET # BLD AUTO: 250 10(3)UL (ref 150–450)
POTASSIUM SERPL-SCNC: 4.3 MMOL/L (ref 3.5–5.1)
RBC # BLD AUTO: 5.66 X10(6)UL (ref 4.3–5.7)
SODIUM SERPL-SCNC: 140 MMOL/L (ref 136–145)
WBC # BLD AUTO: 10.6 X10(3) UL (ref 4–11)

## 2019-09-01 PROCEDURE — 99283 EMERGENCY DEPT VISIT LOW MDM: CPT

## 2019-09-01 PROCEDURE — 85025 COMPLETE CBC W/AUTO DIFF WBC: CPT | Performed by: EMERGENCY MEDICINE

## 2019-09-01 PROCEDURE — 80048 BASIC METABOLIC PNL TOTAL CA: CPT | Performed by: EMERGENCY MEDICINE

## 2019-09-01 PROCEDURE — 80076 HEPATIC FUNCTION PANEL: CPT | Performed by: EMERGENCY MEDICINE

## 2019-09-01 PROCEDURE — 36415 COLL VENOUS BLD VENIPUNCTURE: CPT

## 2019-09-02 NOTE — ED INITIAL ASSESSMENT (HPI)
Pt presents to ED via EMS for animal hallucinations while falling asleep. Pt has no other complaints at this time.

## 2019-09-02 NOTE — ED NOTES
Pt refusing to give urine at this time. Pt refusing a straight cath at this time. Dr. Xochilt Gonzaels at bedside. Dr. Xochilt Gonzales and this RN explained the need for urine to rule out infection. Pt continues to refuse to give urine.

## 2019-09-02 NOTE — ED PROVIDER NOTES
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 The patient is eye patch covering both eyes. No erythema or drainage noted.   ENT, Mouth: mucous membranes moist  Respiratory: there are no re possibility of an infection causing his hallucinations and one of the likely sources of infection would be in his urine. The patient voices understanding of this but refuses to give any urine. Lab results noted.   Will discharge the patient home with pl

## 2019-09-03 ENCOUNTER — HOSPITAL ENCOUNTER (EMERGENCY)
Facility: HOSPITAL | Age: 29
Discharge: NURSING, REHAB OR LONG-TERM CARE | End: 2019-09-04
Attending: EMERGENCY MEDICINE
Payer: MEDICAID

## 2019-09-03 DIAGNOSIS — F32.A DEPRESSION, UNSPECIFIED DEPRESSION TYPE: Primary | ICD-10-CM

## 2019-09-03 LAB
AMPHET UR QL SCN: NEGATIVE
ANION GAP SERPL CALC-SCNC: 8 MMOL/L (ref 0–18)
BARBITURATES UR QL SCN: NEGATIVE
BASOPHILS # BLD AUTO: 0.09 X10(3) UL (ref 0–0.2)
BASOPHILS NFR BLD AUTO: 0.7 %
BENZODIAZ UR QL SCN: NEGATIVE
BUN BLD-MCNC: 18 MG/DL (ref 7–18)
BUN/CREAT SERPL: 15.7 (ref 10–20)
CALCIUM BLD-MCNC: 9.6 MG/DL (ref 8.5–10.1)
CANNABINOIDS UR QL SCN: NEGATIVE
CHLORIDE SERPL-SCNC: 102 MMOL/L (ref 98–112)
CO2 SERPL-SCNC: 29 MMOL/L (ref 21–32)
COCAINE UR QL: NEGATIVE
CREAT BLD-MCNC: 1.15 MG/DL (ref 0.7–1.3)
DEPRECATED RDW RBC AUTO: 45.8 FL (ref 35.1–46.3)
EOSINOPHIL # BLD AUTO: 0.26 X10(3) UL (ref 0–0.7)
EOSINOPHIL NFR BLD AUTO: 1.9 %
ERYTHROCYTE [DISTWIDTH] IN BLOOD BY AUTOMATED COUNT: 17.4 % (ref 11–15)
GLUCOSE BLD-MCNC: 146 MG/DL (ref 70–99)
HCT VFR BLD AUTO: 43.9 % (ref 39–53)
HGB BLD-MCNC: 13.9 G/DL (ref 13–17.5)
IMM GRANULOCYTES # BLD AUTO: 0.12 X10(3) UL (ref 0–1)
IMM GRANULOCYTES NFR BLD: 0.9 %
LYMPHOCYTES # BLD AUTO: 2.25 X10(3) UL (ref 1–4)
LYMPHOCYTES NFR BLD AUTO: 16.6 %
MCH RBC QN AUTO: 24.1 PG (ref 26–34)
MCHC RBC AUTO-ENTMCNC: 31.7 G/DL (ref 31–37)
MCV RBC AUTO: 76.1 FL (ref 80–100)
MDMA UR QL SCN: NEGATIVE
METHADONE UR QL SCN: NEGATIVE
MONOCYTES # BLD AUTO: 0.54 X10(3) UL (ref 0.1–1)
MONOCYTES NFR BLD AUTO: 4 %
NEUTROPHILS # BLD AUTO: 10.26 X10 (3) UL (ref 1.5–7.7)
NEUTROPHILS # BLD AUTO: 10.26 X10(3) UL (ref 1.5–7.7)
NEUTROPHILS NFR BLD AUTO: 75.9 %
OSMOLALITY SERPL CALC.SUM OF ELEC: 293 MOSM/KG (ref 275–295)
OXYCODONE UR QL SCN: NEGATIVE
PCP UR QL SCN: NEGATIVE
PLATELET # BLD AUTO: 269 10(3)UL (ref 150–450)
POTASSIUM SERPL-SCNC: 4.1 MMOL/L (ref 3.5–5.1)
RBC # BLD AUTO: 5.77 X10(6)UL (ref 4.3–5.7)
SODIUM SERPL-SCNC: 139 MMOL/L (ref 136–145)
WBC # BLD AUTO: 13.5 X10(3) UL (ref 4–11)

## 2019-09-03 PROCEDURE — 99285 EMERGENCY DEPT VISIT HI MDM: CPT

## 2019-09-03 PROCEDURE — 80048 BASIC METABOLIC PNL TOTAL CA: CPT | Performed by: EMERGENCY MEDICINE

## 2019-09-03 PROCEDURE — 85025 COMPLETE CBC W/AUTO DIFF WBC: CPT | Performed by: EMERGENCY MEDICINE

## 2019-09-03 PROCEDURE — 36415 COLL VENOUS BLD VENIPUNCTURE: CPT

## 2019-09-03 PROCEDURE — 80307 DRUG TEST PRSMV CHEM ANLYZR: CPT | Performed by: EMERGENCY MEDICINE

## 2019-09-03 RX ORDER — HYDROCODONE BITARTRATE AND ACETAMINOPHEN 5; 325 MG/1; MG/1
1 TABLET ORAL ONCE
Status: COMPLETED | OUTPATIENT
Start: 2019-09-03 | End: 2019-09-03

## 2019-09-04 VITALS
TEMPERATURE: 99 F | DIASTOLIC BLOOD PRESSURE: 59 MMHG | OXYGEN SATURATION: 98 % | RESPIRATION RATE: 16 BRPM | HEART RATE: 78 BPM | SYSTOLIC BLOOD PRESSURE: 140 MMHG

## 2019-09-04 PROCEDURE — 94640 AIRWAY INHALATION TREATMENT: CPT

## 2019-09-04 RX ORDER — IPRATROPIUM BROMIDE AND ALBUTEROL SULFATE 2.5; .5 MG/3ML; MG/3ML
3 SOLUTION RESPIRATORY (INHALATION) EVERY 6 HOURS PRN
Status: DISCONTINUED | OUTPATIENT
Start: 2019-09-04 | End: 2019-09-04

## 2019-09-04 RX ORDER — LORAZEPAM 1 MG/1
2 TABLET ORAL ONCE
Status: COMPLETED | OUTPATIENT
Start: 2019-09-04 | End: 2019-09-04

## 2019-09-04 NOTE — BH LEVEL OF CARE ASSESSMENT
Level of Care Assessment Note    General Questions  Why are you here?: \"I had a miserable day. The morning started fine. I took a shower. I had lunch. At 7 p.m. things got bad. I did notget my meds at 5 p.m.   The CNA that was working with me was move information for CSS[facility]: Patient  In what setting is the screener performed?: in person  1. Have you wished you were dead or wished you could go to sleep and not wake up? (past 30 days): No  2.  Have you actually had any thoughts of killing yourself? (past 30 access to means/firearms/weapons: no collateral obtained     Self Injury  History of Self Injurious Behaviors: No  Present Self-Injurious Behaviors: No    Mental Health Symptoms  Hallucination Type: No problems reported or observed(Jim states that he your current use the most/worst it has ever been? 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

## 2019-09-04 NOTE — ED NOTES
[facility] ordered, ETA 1 hour. Report given to [facility]. Relayed to RN that patient did not receive his nighttime meds and to please give patient nighttime meds when he arrives and states there should be no problems.

## 2019-09-04 NOTE — ED NOTES
Patient brought to room 51 from 31. Patient had no new needs at this time. Claudia to talk to patient again and then patient to return to [facility].

## 2019-09-04 NOTE — CM/SW NOTE
Message sent to Deena Mejía RN in regards to follow up on the care at [place] that the pt has been receiving. Elpidio DEAN aware.
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
## 2019-09-04 NOTE — ED PROVIDER NOTES
Patient Seen in: [facility] Emergency Department    History   Patient presents with:  Eval-P (psychiatric)      HPI    Patient presents to the ED from [facility] after stating that he was suicidal.  He denies this on ED arrival and states that he just f exhibits no discharge. Cardiovascular: Normal rate and intact distal pulses. Pulmonary/Chest: Effort normal. No respiratory distress. Neurological: He is alert and oriented to person, place, and time. Skin: Skin is warm and dry.    Psychiatric: He h 09/04/19  0101   BP: 132/86 134/80 140/59   Pulse: 104 110 78   Resp: 18 16 16   Temp: 98.6 °F (37 °C)     TempSrc: Temporal     SpO2: 97% 98% 98%     *I personally reviewed and interpreted all ED vitals.     Pulse Ox: 98%, Room air, Normal     Differential

## 2019-09-04 NOTE — ED NOTES
Informed that Torey Bernstein wanted to speak to me. Torey Bernstein had questions about ways to handle his frustration situation.   Discussed utilizing available resources, (e.g. socal worker, , family)  and work on coping skills rather than call [phone] when

## 2019-09-05 ENCOUNTER — HOSPITAL ENCOUNTER (EMERGENCY)
Facility: HOSPITAL | Age: 29
Discharge: HOME | End: 2019-09-05
Attending: EMERGENCY MEDICINE
Payer: MEDICAID

## 2019-09-05 VITALS
HEART RATE: 95 BPM | DIASTOLIC BLOOD PRESSURE: 87 MMHG | OXYGEN SATURATION: 95 % | TEMPERATURE: 98 F | SYSTOLIC BLOOD PRESSURE: 130 MMHG | BODY MASS INDEX: 34 KG/M2 | RESPIRATION RATE: 18 BRPM | WEIGHT: 216 LBS

## 2019-09-05 DIAGNOSIS — G47.00 INSOMNIA, UNSPECIFIED TYPE: Primary | ICD-10-CM

## 2019-09-05 DIAGNOSIS — R44.3 HALLUCINATIONS: ICD-10-CM

## 2019-09-05 PROCEDURE — 99283 EMERGENCY DEPT VISIT LOW MDM: CPT

## 2019-09-05 PROCEDURE — 96372 THER/PROPH/DIAG INJ SC/IM: CPT

## 2019-09-05 RX ORDER — HYDROXYZINE HYDROCHLORIDE 25 MG/1
25 TABLET, FILM COATED ORAL NIGHTLY PRN
Qty: 5 TABLET | Refills: 0 | Status: SHIPPED | OUTPATIENT
Start: 2019-09-05 | End: 2019-09-10

## 2019-09-05 RX ORDER — HYDROXYZINE HYDROCHLORIDE 50 MG/ML
25 INJECTION, SOLUTION INTRAMUSCULAR ONCE
Status: COMPLETED | OUTPATIENT
Start: 2019-09-05 | End: 2019-09-05

## 2019-09-05 NOTE — ED INITIAL ASSESSMENT (HPI)
Pt STEVE Pham FD d/t nightmares/bad memories, AH/VH. Pt states he \"sees people around me, around my bed. [place] I was seeing that the nurses were passing out candy. \" endorses inability to sleep at night.  States he is
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
## 2019-09-29 ENCOUNTER — HOSPITAL ENCOUNTER (EMERGENCY)
Facility: HOSPITAL | Age: 29
Discharge: HOME | End: 2019-09-29
Attending: PHYSICIAN ASSISTANT
Payer: MEDICAID

## 2019-09-29 VITALS
RESPIRATION RATE: 20 BRPM | TEMPERATURE: 99 F | BODY MASS INDEX: 36 KG/M2 | WEIGHT: 232 LBS | SYSTOLIC BLOOD PRESSURE: 121 MMHG | HEART RATE: 97 BPM | DIASTOLIC BLOOD PRESSURE: 79 MMHG | OXYGEN SATURATION: 97 %

## 2019-09-29 DIAGNOSIS — H57.89 EYE IRRITATION: Primary | ICD-10-CM

## 2019-09-29 PROCEDURE — 99283 EMERGENCY DEPT VISIT LOW MDM: CPT

## 2019-09-29 RX ORDER — MINERAL OIL AND PETROLATUM 150; 830 MG/G; MG/G
OINTMENT OPHTHALMIC ONCE
Status: COMPLETED | OUTPATIENT
Start: 2019-09-29 | End: 2019-09-29

## 2019-09-29 RX ORDER — LANOLIN/MINERAL OIL/PETROLATUM
1 OINTMENT (GRAM) OPHTHALMIC (EYE) EVERY 2 HOUR PRN
Qty: 1 TUBE | Refills: 0 | Status: SHIPPED | OUTPATIENT
Start: 2019-09-29 | End: 2020-05-16

## 2019-09-29 NOTE — ED PROVIDER NOTES
Patient Seen in: [facility] AND 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 bisacodyl 10 MG Rectal Suppos,  Place 10 mg rectally daily as needed. ferrous sulfate 325 (65 FE) MG Oral Tab EC,  Take 325 mg by mouth 2 (two) times daily with meals.    gabapentin 600 MG Oral Tab,  Take 600 mg by mouth every 8 (eig PULSE OX within normal limits on room air as interpreted by this provider. Constitutional: The patient is cooperative. Appears well-developed and well-nourished. No acute distress. Psychological: Alert, No abnormalities of mood, affect.   Head: N Center    Go in 2 days  For follow-up as scheduled    We recommend that you schedule follow up care with a primary care provider within the next three months to obtain basic health screening including reassessment of your blood pressure.     Medications Pre

## 2019-09-29 NOTE — ED INITIAL ASSESSMENT (HPI)
Pt from [facility], hx TBI. Per EMS, pt called them because of eye irritation and staff was late getting his eye drops.

## 2019-10-03 ENCOUNTER — HOSPITAL ENCOUNTER (EMERGENCY)
Facility: HOSPITAL | Age: 29
Discharge: HOME | End: 2019-10-04
Attending: EMERGENCY MEDICINE
Payer: MEDICAID

## 2019-10-03 ENCOUNTER — HOSPITAL ENCOUNTER (EMERGENCY)
Facility: HOSPITAL | Age: 29
Discharge: HOME | End: 2019-10-03
Attending: EMERGENCY MEDICINE
Payer: MEDICAID

## 2019-10-03 VITALS
HEART RATE: 77 BPM | TEMPERATURE: 98 F | OXYGEN SATURATION: 99 % | SYSTOLIC BLOOD PRESSURE: 122 MMHG | DIASTOLIC BLOOD PRESSURE: 73 MMHG | RESPIRATION RATE: 17 BRPM

## 2019-10-03 DIAGNOSIS — F51.5 BAD DREAMS: ICD-10-CM

## 2019-10-03 DIAGNOSIS — G47.09 OTHER INSOMNIA: Primary | ICD-10-CM

## 2019-10-03 DIAGNOSIS — Z76.89 ENCOUNTER FOR MEDICATION ADMINISTRATION: Primary | ICD-10-CM

## 2019-10-03 PROCEDURE — 99283 EMERGENCY DEPT VISIT LOW MDM: CPT

## 2019-10-03 RX ORDER — LEVETIRACETAM 500 MG/1
500 TABLET ORAL 2 TIMES DAILY
Status: DISCONTINUED | OUTPATIENT
Start: 2019-10-03 | End: 2019-10-03

## 2019-10-03 RX ORDER — ACETAMINOPHEN 325 MG/1
325 TABLET ORAL ONCE
Status: COMPLETED | OUTPATIENT
Start: 2019-10-03 | End: 2019-10-03

## 2019-10-03 RX ORDER — HYDROXYZINE HYDROCHLORIDE 10 MG/1
10 TABLET, FILM COATED ORAL ONCE
Status: COMPLETED | OUTPATIENT
Start: 2019-10-03 | End: 2019-10-03

## 2019-10-03 RX ORDER — QUETIAPINE 25 MG/1
25 TABLET, FILM COATED ORAL NIGHTLY
Status: DISCONTINUED | OUTPATIENT
Start: 2019-10-03 | End: 2019-10-03

## 2019-10-03 RX ORDER — ACETAMINOPHEN 325 MG/1
325 TABLET ORAL EVERY 6 HOURS PRN
Status: DISCONTINUED | OUTPATIENT
Start: 2019-10-03 | End: 2019-10-03

## 2019-10-03 RX ORDER — TRAZODONE HYDROCHLORIDE 50 MG/1
50 TABLET ORAL NIGHTLY
Status: DISCONTINUED | OUTPATIENT
Start: 2019-10-03 | End: 2019-10-03

## 2019-10-03 RX ORDER — HYDROXYZINE HYDROCHLORIDE 25 MG/1
25 TABLET, FILM COATED ORAL EVERY EVENING
Qty: 14 TABLET | Refills: 0 | Status: SHIPPED | OUTPATIENT
Start: 2019-10-03 | End: 2019-10-17
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
## 2019-10-03 NOTE — ED INITIAL ASSESSMENT (HPI)
Care assumed from EMS. Pt from [facility] at [facility], states that his nurse went to give him his night time meds at 2100, but he stated that he did not want the medications yet since he wasn't tired.  Pt states that he then requested his meds around 2345 and the
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
## 2019-10-03 NOTE — CM/SW NOTE
Jean Paul Tai and spoke with Ryan Jurado,  for patient. EDCM explained that patient voiced some grievances he had with [facility] and the care he is receiving there. EDCM listed some of the complaints and requested that Ryan Jurado CM visit patient today.  She sta

## 2019-10-03 NOTE — ED PROVIDER NOTES
Patient Seen in: [facility] Emergency Department      History   Patient presents with:   Other    Stated Complaint:     HPI    Patient presents to the emergency department today from his skilled nursing facility complaining that he did not receive Review of Systems    Positive for stated complaint:   Other systems are as noted in HPI. Constitutional and vital signs reviewed. All other systems reviewed and negative except as noted above.     Physical Exam     ED Triage Vitals [10/03/19 003
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
## 2019-10-04 VITALS
BODY MASS INDEX: 36.41 KG/M2 | HEART RATE: 82 BPM | WEIGHT: 232 LBS | RESPIRATION RATE: 18 BRPM | SYSTOLIC BLOOD PRESSURE: 122 MMHG | OXYGEN SATURATION: 96 % | HEIGHT: 67 IN | TEMPERATURE: 98 F | DIASTOLIC BLOOD PRESSURE: 84 MMHG

## 2019-10-04 RX ORDER — DIAZEPAM 5 MG/1
5 TABLET ORAL ONCE
Status: COMPLETED | OUTPATIENT
Start: 2019-10-04 | End: 2019-10-04

## 2019-10-04 RX ORDER — DIAZEPAM 2 MG/1
2 TABLET ORAL ONCE
Status: COMPLETED | OUTPATIENT
Start: 2019-10-04 | End: 2019-10-04

## 2019-10-04 NOTE — CM/SW NOTE
[facility] ambulance arranged. ETA 30 - 45 mins (approx 0100 - 0115))    PCS form completed and copy placed in medical record tray for scanning. RN notified.

## 2019-10-04 NOTE — ED NOTES
Pt dcd back to Nursing Home, Report given to Bashir Miranda, pt transferred via [facility] ambulance, discharge instruction given to pt and voices understanding, pt refuse to sign discharge paper

## 2019-10-04 NOTE — ED PROVIDER NOTES
Patient Seen in: [facility] Emergency Department    History   Patient presents with:   Other    Stated Complaint: woke up and saw phantom light that resolved right away    HPI    Patient is here because he states he fell asleep but then he had a  daily.   baclofen 10 MG Oral Tab,  Take 10 mg by mouth 3 (three) times daily. diphenhydrAMINE HCl 25 MG Oral Cap,  Take 12.5 mg by mouth nightly as needed for Itching. bisacodyl 10 MG Rectal Suppos,  Place 10 mg rectally daily as needed.    ferrous sulf 96%   BMI 36.34 kg/m²         Physical Exam  Constitutional:  Alert, well nourished adult lying in bed in no distress. Vital signs noted. He is talking without difficulty  Eye:  No scleral icterus. Eyelids appear normal, no lesions.   Musculoskeletal:  G

## 2019-10-04 NOTE — ED INITIAL ASSESSMENT (HPI)
Pt arrive in ER per ambulance with c/o phantom lights syndrome that he experienced in the past that resolved right away, sts that he took his night meds and fall asleep then woke up after 10 minutes and experience the symptom, pt was told by his ophthalmol

## 2019-10-07 NOTE — ED INITIAL ASSESSMENT (HPI)
Pt here for depressed and SI. Pt states that he wants ativan PO and staff refuse to give him ativan. Pt states that he keeps calling the nursing station and they hang up on him.  Pt denies a plan and denies HI
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
## 2019-10-08 ENCOUNTER — HOSPITAL ENCOUNTER (EMERGENCY)
Facility: HOSPITAL | Age: 29
Discharge: HOME | End: 2019-10-09
Attending: EMERGENCY MEDICINE
Payer: MEDICAID

## 2019-10-08 VITALS
BODY MASS INDEX: 36.41 KG/M2 | DIASTOLIC BLOOD PRESSURE: 67 MMHG | SYSTOLIC BLOOD PRESSURE: 103 MMHG | WEIGHT: 232 LBS | HEART RATE: 89 BPM | RESPIRATION RATE: 22 BRPM | TEMPERATURE: 98 F | HEIGHT: 67 IN | OXYGEN SATURATION: 99 %

## 2019-10-08 DIAGNOSIS — H57.89 BURNING SENSATION OF EYE: Primary | ICD-10-CM

## 2019-10-08 PROCEDURE — 99283 EMERGENCY DEPT VISIT LOW MDM: CPT

## 2019-10-08 NOTE — ED NOTES
[facility] called for transport back to [address] [place] [address]. ETA per Brandy Mcnally- 30-45 min  Per Dr Kristopher Louie no crisis evaluation needed. Pt ok for discharge back to [facility] today.

## 2019-10-08 NOTE — ED NOTES
[facility] here to transport patient back to [facility]. Pt agreeable to plan. Pt denies SI/HI. Pt states \"all I wanted was my Ativan\".    Per David Mancia RN at [facility] frequently calls 911 to come to the hospital.

## 2019-10-09 NOTE — CM/SW NOTE
This patient is known to [facility], as he has been in the ER 8 times in the last 7 weeks, and 4 times in the last 4 days. LAURIEM spoke with patient regarding his reason for coming to the ER today (as he was in ER yesterday).   He is complaining of eye irritat

## 2019-10-09 NOTE — ED PROVIDER NOTES
Patient Seen in: [facility] AND [facility] Emergency Department    History   Patient presents with: Eye Visual Problem (opthalmic)      HPI    Patient presents to the ED complaining of burning to bilateral eyes. Symptoms chronic.   History of blindness due to g rate and intact distal pulses. Pulmonary/Chest: Effort normal. No stridor. No respiratory distress. Abdominal: Soft. He exhibits no distension. Neurological: He is alert and oriented to person, place, and time. Skin: Skin is warm and dry.    Psychia PM

## 2019-10-09 NOTE — ED NOTES
Per EMS pt had his scheduled dose of Norco, ativan, and eye drops per facility RN. Pt has a eye mask in place.

## 2019-10-09 NOTE — ED NOTES
Writer spoke with Zaki Pinto RN from [email or web address] and aware of patient return to facility. [facility] ambulance called for patient transport. ETA 45-60 minutes. Pt updated on POC.

## 2019-10-16 ENCOUNTER — HOSPITAL ENCOUNTER (EMERGENCY)
Facility: HOSPITAL | Age: 29
Discharge: HOME | End: 2019-10-17
Attending: EMERGENCY MEDICINE
Payer: MEDICAID

## 2019-10-16 VITALS
OXYGEN SATURATION: 99 % | SYSTOLIC BLOOD PRESSURE: 120 MMHG | DIASTOLIC BLOOD PRESSURE: 80 MMHG | HEART RATE: 80 BPM | RESPIRATION RATE: 20 BRPM | TEMPERATURE: 98 F

## 2019-10-16 DIAGNOSIS — R44.3 HALLUCINATION: ICD-10-CM

## 2019-10-16 DIAGNOSIS — G47.00 INSOMNIA, UNSPECIFIED TYPE: Primary | ICD-10-CM

## 2019-10-16 PROCEDURE — 94640 AIRWAY INHALATION TREATMENT: CPT

## 2019-10-16 PROCEDURE — 99284 EMERGENCY DEPT VISIT MOD MDM: CPT

## 2019-10-16 RX ORDER — IPRATROPIUM BROMIDE AND ALBUTEROL SULFATE 2.5; .5 MG/3ML; MG/3ML
3 SOLUTION RESPIRATORY (INHALATION) ONCE
Status: COMPLETED | OUTPATIENT
Start: 2019-10-16 | End: 2019-10-16

## 2019-10-16 RX ORDER — LORAZEPAM 1 MG/1
2 TABLET ORAL ONCE
Status: COMPLETED | OUTPATIENT
Start: 2019-10-16 | End: 2019-10-16

## 2019-10-17 NOTE — CM/SW NOTE
Patient returning to [address] completed and given to [facility] and copy given to registration to scan.

## 2019-10-17 NOTE — ED PROVIDER NOTES
Patient Seen in: [facility] AND [facility] Emergency Department      History   Patient presents with:   Eye Visual Problem (opthalmic)    Stated Complaint:     HPI    Patient presents to the emergency department stating that he was hallucinating, seeing \"clemencia, Functionally blind. Eyes:      Conjunctiva/sclera: Conjunctivae normal.   Neck:      Musculoskeletal: Normal range of motion and neck supple. Cardiovascular:      Rate and Rhythm: Normal rate and regular rhythm. Heart sounds: No murmur.    Pulmonary

## 2019-10-17 NOTE — ED NOTES
Pt comes in with visual disturbance complaints, stating that he is \"seeing lights and snakes and bears. \" Pt states he just wants something for sleep and to go home, MD Laura Dallas at bedside.

## 2019-10-17 NOTE — ED INITIAL ASSESSMENT (HPI)
Pt reports seeing bears and snakes x 1 hour. Pt with hx blindness and reports typically seeing flashes of lights but never bears, snakes or any other wildlife.

## 2019-10-19 ENCOUNTER — HOSPITAL ENCOUNTER (EMERGENCY)
Facility: HOSPITAL | Age: 29
Discharge: HOME | End: 2019-10-20
Attending: EMERGENCY MEDICINE
Payer: MEDICAID

## 2019-10-19 DIAGNOSIS — R09.81 NASAL CONGESTION: Primary | ICD-10-CM

## 2019-10-19 PROCEDURE — 99282 EMERGENCY DEPT VISIT SF MDM: CPT

## 2019-10-20 VITALS
HEART RATE: 100 BPM | SYSTOLIC BLOOD PRESSURE: 127 MMHG | WEIGHT: 231.5 LBS | RESPIRATION RATE: 20 BRPM | TEMPERATURE: 99 F | BODY MASS INDEX: 36 KG/M2 | DIASTOLIC BLOOD PRESSURE: 90 MMHG | OXYGEN SATURATION: 95 %

## 2019-10-20 NOTE — ED PROVIDER NOTES
Patient Seen in: [facility] AND [facility] Emergency Department      History   Patient presents with:  Cough/URI    Stated Complaint:     HPI    Patient is a 34-year-old male with a long-standing history of TBI who presents to the emergency department complainin Pulmonary:      Effort: Pulmonary effort is normal.   Musculoskeletal: Normal range of motion. Skin:     General: Skin is warm and dry. Findings: No rash. Neurological:      Mental Status: He is alert and oriented to person, place, and time.  Men

## 2019-11-10 ENCOUNTER — HOSPITAL ENCOUNTER (EMERGENCY)
Facility: HOSPITAL | Age: 29
Discharge: HOME | End: 2019-11-10
Attending: EMERGENCY MEDICINE
Payer: MEDICAID

## 2019-11-10 VITALS
TEMPERATURE: 98 F | HEIGHT: 67 IN | OXYGEN SATURATION: 97 % | HEART RATE: 89 BPM | DIASTOLIC BLOOD PRESSURE: 86 MMHG | BODY MASS INDEX: 36.34 KG/M2 | RESPIRATION RATE: 18 BRPM | SYSTOLIC BLOOD PRESSURE: 129 MMHG | WEIGHT: 231.5 LBS

## 2019-11-10 DIAGNOSIS — H57.13 PAIN OF BOTH EYES: Primary | ICD-10-CM

## 2019-11-10 PROCEDURE — 99283 EMERGENCY DEPT VISIT LOW MDM: CPT

## 2019-11-10 RX ORDER — PURIFIED WATER 986 MG/ML
5 SOLUTION OPHTHALMIC AS NEEDED
Status: DISCONTINUED | OUTPATIENT
Start: 2019-11-10 | End: 2019-11-10

## 2019-11-11 NOTE — ED NOTES
Care assumed by [facility] personnel [facility].
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
## 2019-11-11 NOTE — ED INITIAL ASSESSMENT (HPI)
Patient c/o right eye pain and left eye discharge. Optho took right eye prosthetic out 1.5 weeks ago to see if that may help the irritation. Initially improved, but became worse again last night.
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
## 2019-11-20 NOTE — ED PROVIDER NOTES
Patient Seen in: [facility] Emergency Department      History   Patient presents with:  Headache (neurologic)  Eval-P (psychiatric)    Stated Complaint: Pt complains of visual hallucinations/flashbacks and severe headache.     HPI    26year-old ma for weakness and light-headedness. Psychiatric/Behavioral: Positive for hallucinations. All other systems reviewed and are negative. Positive for stated complaint: Pt complains of visual hallucinations/flashbacks and severe headache.   Other system Emergency Department. They were informed of the dangers of undiagnosed and untreated hypertension.   Education regarding lifestyle modifications and the need for appropriate follow-up with their PCP to have their blood pressure re-checked within 1 week was

## 2019-11-20 NOTE — ED NOTES
Pt arrives from [facility] via EMS d/t complaints of hallucinations, headache and difficulty sleeping.

## 2019-11-20 NOTE — ED NOTES
This writer was asked to speak with the patient by Dr. Daja Beyer to provide outpatient resources for PTSD/flashbacks. This writer included trauma therapists that accept his insurance, psychoeducation and crises numbers.  Patient is blind but verbalized [place]

## 2019-12-04 ENCOUNTER — HOSPITAL ENCOUNTER (EMERGENCY)
Facility: HOSPITAL | Age: 29
Discharge: HOME | End: 2019-12-05
Attending: EMERGENCY MEDICINE
Payer: MEDICAID

## 2019-12-04 DIAGNOSIS — H57.89 EYE IRRITATION: Primary | ICD-10-CM

## 2019-12-04 PROCEDURE — 99282 EMERGENCY DEPT VISIT SF MDM: CPT

## 2019-12-04 RX ORDER — IBUPROFEN 600 MG/1
600 TABLET ORAL ONCE
Status: COMPLETED | OUTPATIENT
Start: 2019-12-04 | End: 2019-12-04

## 2019-12-05 VITALS
SYSTOLIC BLOOD PRESSURE: 127 MMHG | OXYGEN SATURATION: 95 % | DIASTOLIC BLOOD PRESSURE: 88 MMHG | HEART RATE: 90 BPM | RESPIRATION RATE: 18 BRPM | TEMPERATURE: 99 F
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
## 2019-12-05 NOTE — ED INITIAL ASSESSMENT (HPI)
C/o irritation and pain to right eye socket for the past several days. Pt reports he saw his optho 11/29 and took his right eye prosthesis out. Also concerned about tearing from left eye.

## 2019-12-05 NOTE — ED NOTES
Pt received and explained discharge instructions, pt verbalized understanding. Pt denies any acute distress and has no complaints at this time.

## 2019-12-10 ENCOUNTER — HOSPITAL ENCOUNTER (EMERGENCY)
Facility: HOSPITAL | Age: 29
Discharge: HOME | End: 2019-12-10
Attending: EMERGENCY MEDICINE
Payer: MEDICAID

## 2019-12-10 VITALS
RESPIRATION RATE: 18 BRPM | OXYGEN SATURATION: 95 % | HEART RATE: 101 BPM | SYSTOLIC BLOOD PRESSURE: 128 MMHG | TEMPERATURE: 99 F | WEIGHT: 232 LBS | BODY MASS INDEX: 36.41 KG/M2 | HEIGHT: 67 IN | DIASTOLIC BLOOD PRESSURE: 82 MMHG

## 2019-12-10 DIAGNOSIS — H59.89: Primary | ICD-10-CM

## 2019-12-10 PROCEDURE — 87077 CULTURE AEROBIC IDENTIFY: CPT | Performed by: EMERGENCY MEDICINE

## 2019-12-10 PROCEDURE — 99284 EMERGENCY DEPT VISIT MOD MDM: CPT

## 2019-12-10 PROCEDURE — 87186 SC STD MICRODIL/AGAR DIL: CPT | Performed by: EMERGENCY MEDICINE

## 2019-12-10 PROCEDURE — 87070 CULTURE OTHR SPECIMN AEROBIC: CPT | Performed by: EMERGENCY MEDICINE

## 2019-12-10 PROCEDURE — 99283 EMERGENCY DEPT VISIT LOW MDM: CPT

## 2019-12-10 PROCEDURE — 87205 SMEAR GRAM STAIN: CPT | Performed by: EMERGENCY MEDICINE

## 2019-12-10 RX ORDER — ERYTHROMYCIN 5 MG/G
1 OINTMENT OPHTHALMIC EVERY 6 HOURS
Qty: 1 G | Refills: 0 | Status: SHIPPED | OUTPATIENT
Start: 2019-12-10 | End: 2019-12-17

## 2019-12-10 NOTE — ED PROVIDER NOTES
Patient Seen in: [facility] AND [facility] Emergency Department      History   Patient presents with:   Eye Visual Problem  Back Pain    Stated Complaint: eye pain    HPI    53-year-old from the nursing home here not infrequently for multiple complaints but predo kg/m²         Physical Exam    Constitutional: Oriented to person, place, and time. Appears well-developed. No distress. Head: Normocephalic with noted posttraumatic changes that are chronic.   Eyes: Enucleation of the right eye with the socket having so Prescribed:  Current Discharge Medication List    START taking these medications    erythromycin 5 MG/GM Ophthalmic Ointment  Place 1 Application into the right eye every 6 (six) hours for 7 days. APPLY THINLY TO RIGHT SOCKET.   Qty: 1 g Refills: 0

## 2019-12-12 NOTE — PROGRESS NOTES
D/c Erythromycin ointment and start gentamicin ophthalmic ointment 0.3%, apply 0.5 inch ribbon bilateral eyes every 4 hours while awake for ten days. Disp QS.

## 2019-12-27 ENCOUNTER — HOSPITAL ENCOUNTER (EMERGENCY)
Facility: HOSPITAL | Age: 29
Discharge: HOME | End: 2019-12-28
Attending: EMERGENCY MEDICINE
Payer: MEDICAID

## 2019-12-27 DIAGNOSIS — F32.A DEPRESSION, UNSPECIFIED DEPRESSION TYPE: ICD-10-CM

## 2019-12-27 DIAGNOSIS — H02.89 IRRITATION OF EYELID: Primary | ICD-10-CM

## 2019-12-27 PROCEDURE — 99284 EMERGENCY DEPT VISIT MOD MDM: CPT

## 2019-12-27 PROCEDURE — 36415 COLL VENOUS BLD VENIPUNCTURE: CPT

## 2019-12-28 VITALS
TEMPERATURE: 97 F | DIASTOLIC BLOOD PRESSURE: 61 MMHG | OXYGEN SATURATION: 97 % | WEIGHT: 232 LBS | SYSTOLIC BLOOD PRESSURE: 139 MMHG | BODY MASS INDEX: 36.41 KG/M2 | HEART RATE: 78 BPM | RESPIRATION RATE: 18 BRPM | HEIGHT: 67 IN

## 2019-12-28 LAB
BASOPHILS # BLD AUTO: 0.1 X10(3) UL (ref 0–0.2)
BASOPHILS NFR BLD AUTO: 1.1 %
DEPRECATED RDW RBC AUTO: 46.3 FL (ref 35.1–46.3)
EOSINOPHIL # BLD AUTO: 0.29 X10(3) UL (ref 0–0.7)
EOSINOPHIL NFR BLD AUTO: 3.2 %
ERYTHROCYTE [DISTWIDTH] IN BLOOD BY AUTOMATED COUNT: 16.4 % (ref 11–15)
HCT VFR BLD AUTO: 43.1 % (ref 39–53)
HGB BLD-MCNC: 13.5 G/DL (ref 13–17.5)
IMM GRANULOCYTES # BLD AUTO: 0.09 X10(3) UL (ref 0–1)
IMM GRANULOCYTES NFR BLD: 1 %
LYMPHOCYTES # BLD AUTO: 2.4 X10(3) UL (ref 1–4)
LYMPHOCYTES NFR BLD AUTO: 26.1 %
MCH RBC QN AUTO: 24.7 PG (ref 26–34)
MCHC RBC AUTO-ENTMCNC: 31.3 G/DL (ref 31–37)
MCV RBC AUTO: 78.9 FL (ref 80–100)
MONOCYTES # BLD AUTO: 0.51 X10(3) UL (ref 0.1–1)
MONOCYTES NFR BLD AUTO: 5.6 %
NEUTROPHILS # BLD AUTO: 5.79 X10 (3) UL (ref 1.5–7.7)
NEUTROPHILS # BLD AUTO: 5.79 X10(3) UL (ref 1.5–7.7)
NEUTROPHILS NFR BLD AUTO: 63 %
PLATELET # BLD AUTO: 215 10(3)UL (ref 150–450)
RBC # BLD AUTO: 5.46 X10(6)UL (ref 4.3–5.7)
WBC # BLD AUTO: 9.2 X10(3) UL (ref 4–11)

## 2019-12-28 PROCEDURE — 80048 BASIC METABOLIC PNL TOTAL CA: CPT | Performed by: EMERGENCY MEDICINE

## 2019-12-28 PROCEDURE — 80320 DRUG SCREEN QUANTALCOHOLS: CPT | Performed by: EMERGENCY MEDICINE

## 2019-12-28 PROCEDURE — 85025 COMPLETE CBC W/AUTO DIFF WBC: CPT | Performed by: EMERGENCY MEDICINE

## 2019-12-28 NOTE — ED INITIAL ASSESSMENT (HPI)
Pt brought in by EMS for complaints of right ear pain and SI. Per pt he has plan of ending his life by using the knife in the dinner tray. Per pt he is depressed, and tired of dealing with his right eye which is burning, and irritation.
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
## 2019-12-28 NOTE — BH LEVEL OF CARE ASSESSMENT
Level of Care Assessment Note    General Questions  Why are you here?: \"Bad stuff is going on. I cannot take it anymore. \"   Precipitating Events: Pt states that he has been having issues with his eye.  Pt states that the nursing home ran out of what they you intend to carry out this plan? (past 30 days): No  6.  Have you ever done anything, started to do anything, or prepared to do anything to end your life? (lifetime): No  Score -  OV: 1- Low Risk   Describe : Pt states \"I wanted to end my life today be having hallucinations. 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    Delusions: No problems reported or observed  Depression Symptoms: No problems reported or observed  Anxiety Symptoms: Generalized  Panic Attacks: Pt rep alcohol? : Never       Illicit and Prescription Drug Use  Which if any illicit/prescription drugs have you used/abused?: Denies                                                                Support for Recovery  Is your living environment a supportive clifford Affect: Stable  Attitude toward staff: Co-operative;Open  Speech  Rate of Speech: Appropriate  Flow of Speech: Appropriate  Intensity of Volume: Ordinary  Clarity: Clear  Cognition  Concentration: Unimpaired(Pt able to focus for entire assessment)  Memory: of trauma; Physical Illness;Stressful life events or loss  Protective Factors: Pt lives in nursing home     Motivational Stage of Change  Motivational Stage of Change: Action    Level of Care Recommendations  Consulted with: Dr. Cheyenne Manzo of Care Rec

## 2020-03-01 ENCOUNTER — HOSPITAL ENCOUNTER (EMERGENCY)
Facility: HOSPITAL | Age: 30
Discharge: HOME | End: 2020-03-02
Attending: EMERGENCY MEDICINE
Payer: MEDICAID

## 2020-03-01 DIAGNOSIS — F41.9 ANXIETY: ICD-10-CM

## 2020-03-01 DIAGNOSIS — F32.A DEPRESSION, UNSPECIFIED DEPRESSION TYPE: Primary | ICD-10-CM

## 2020-03-01 DIAGNOSIS — E11.65 TYPE 2 DIABETES MELLITUS WITH HYPERGLYCEMIA, WITHOUT LONG-TERM CURRENT USE OF INSULIN (HCC): ICD-10-CM

## 2020-03-01 DIAGNOSIS — R73.9 HYPERGLYCEMIA: ICD-10-CM

## 2020-03-01 LAB
AMPHET UR QL SCN: NEGATIVE
ANION GAP SERPL CALC-SCNC: 10 MMOL/L (ref 0–18)
BARBITURATES UR QL SCN: NEGATIVE
BASOPHILS # BLD AUTO: 0.12 X10(3) UL (ref 0–0.2)
BASOPHILS NFR BLD AUTO: 1.5 %
BENZODIAZ UR QL SCN: NEGATIVE
BUN BLD-MCNC: 13 MG/DL (ref 7–18)
BUN/CREAT SERPL: 11.3 (ref 10–20)
CALCIUM BLD-MCNC: 8.6 MG/DL (ref 8.5–10.1)
CANNABINOIDS UR QL SCN: NEGATIVE
CHLORIDE SERPL-SCNC: 96 MMOL/L (ref 98–112)
CO2 SERPL-SCNC: 23 MMOL/L (ref 21–32)
COCAINE UR QL: NEGATIVE
CREAT BLD-MCNC: 1.15 MG/DL (ref 0.7–1.3)
DEPRECATED RDW RBC AUTO: 45.4 FL (ref 35.1–46.3)
EOSINOPHIL # BLD AUTO: 0.28 X10(3) UL (ref 0–0.7)
EOSINOPHIL NFR BLD AUTO: 3.5 %
ERYTHROCYTE [DISTWIDTH] IN BLOOD BY AUTOMATED COUNT: 16.5 % (ref 11–15)
ETHANOL SERPL-MCNC: <3 MG/DL (ref ?–3)
GLUCOSE BLD-MCNC: 566 MG/DL (ref 70–99)
GLUCOSE BLDC GLUCOMTR-MCNC: 478 MG/DL (ref 70–99)
GLUCOSE BLDC GLUCOMTR-MCNC: 516 MG/DL (ref 70–99)
HCT VFR BLD AUTO: 46.1 % (ref 39–53)
HGB BLD-MCNC: 15.2 G/DL (ref 13–17.5)
IMM GRANULOCYTES # BLD AUTO: 0.2 X10(3) UL (ref 0–1)
IMM GRANULOCYTES NFR BLD: 2.5 %
LYMPHOCYTES # BLD AUTO: 2.17 X10(3) UL (ref 1–4)
LYMPHOCYTES NFR BLD AUTO: 27 %
MCH RBC QN AUTO: 25.9 PG (ref 26–34)
MCHC RBC AUTO-ENTMCNC: 33 G/DL (ref 31–37)
MCV RBC AUTO: 78.7 FL (ref 80–100)
MDMA UR QL SCN: NEGATIVE
METHADONE UR QL SCN: NEGATIVE
MONOCYTES # BLD AUTO: 0.5 X10(3) UL (ref 0.1–1)
MONOCYTES NFR BLD AUTO: 6.2 %
NEUTROPHILS # BLD AUTO: 4.77 X10 (3) UL (ref 1.5–7.7)
NEUTROPHILS # BLD AUTO: 4.77 X10(3) UL (ref 1.5–7.7)
NEUTROPHILS NFR BLD AUTO: 59.3 %
OPIATES UR QL SCN: NEGATIVE
OSMOLALITY SERPL CALC.SUM OF ELEC: 294 MOSM/KG (ref 275–295)
OXYCODONE UR QL SCN: NEGATIVE
PCP UR QL SCN: NEGATIVE
PLATELET # BLD AUTO: 225 10(3)UL (ref 150–450)
POTASSIUM SERPL-SCNC: 4.6 MMOL/L (ref 3.5–5.1)
RBC # BLD AUTO: 5.86 X10(6)UL (ref 4.3–5.7)
SODIUM SERPL-SCNC: 129 MMOL/L (ref 136–145)
WBC # BLD AUTO: 8 X10(3) UL (ref 4–11)

## 2020-03-01 PROCEDURE — 36415 COLL VENOUS BLD VENIPUNCTURE: CPT

## 2020-03-01 PROCEDURE — 80048 BASIC METABOLIC PNL TOTAL CA: CPT | Performed by: EMERGENCY MEDICINE

## 2020-03-01 PROCEDURE — 85025 COMPLETE CBC W/AUTO DIFF WBC: CPT | Performed by: EMERGENCY MEDICINE

## 2020-03-01 PROCEDURE — 99284 EMERGENCY DEPT VISIT MOD MDM: CPT

## 2020-03-01 PROCEDURE — 96372 THER/PROPH/DIAG INJ SC/IM: CPT

## 2020-03-01 PROCEDURE — 80307 DRUG TEST PRSMV CHEM ANLYZR: CPT | Performed by: EMERGENCY MEDICINE

## 2020-03-01 PROCEDURE — 82962 GLUCOSE BLOOD TEST: CPT

## 2020-03-01 PROCEDURE — 80320 DRUG SCREEN QUANTALCOHOLS: CPT | Performed by: EMERGENCY MEDICINE

## 2020-03-01 PROCEDURE — 94640 AIRWAY INHALATION TREATMENT: CPT

## 2020-03-01 RX ORDER — IPRATROPIUM BROMIDE AND ALBUTEROL SULFATE 2.5; .5 MG/3ML; MG/3ML
3 SOLUTION RESPIRATORY (INHALATION) ONCE
Status: COMPLETED | OUTPATIENT
Start: 2020-03-01 | End: 2020-03-01

## 2020-03-01 RX ORDER — INSULIN ASPART 100 [IU]/ML
10 INJECTION, SOLUTION INTRAVENOUS; SUBCUTANEOUS ONCE
Status: COMPLETED | OUTPATIENT
Start: 2020-03-01 | End: 2020-03-01

## 2020-03-01 RX ORDER — INSULIN ASPART 100 [IU]/ML
14 INJECTION, SOLUTION INTRAVENOUS; SUBCUTANEOUS ONCE
Status: COMPLETED | OUTPATIENT
Start: 2020-03-01 | End: 2020-03-01

## 2020-03-02 VITALS
RESPIRATION RATE: 16 BRPM | HEART RATE: 96 BPM | TEMPERATURE: 98 F | SYSTOLIC BLOOD PRESSURE: 133 MMHG | DIASTOLIC BLOOD PRESSURE: 88 MMHG | OXYGEN SATURATION: 95 %

## 2020-03-02 LAB — GLUCOSE BLDC GLUCOMTR-MCNC: 321 MG/DL (ref 70–99)

## 2020-03-02 PROCEDURE — 82962 GLUCOSE BLOOD TEST: CPT

## 2020-03-02 NOTE — ED NOTES
This writer attempted to engage the patient in an assess as requested by Dr. Shaista Grover. Patient is refused to answer this writer display being awake. This writer consulted with the RN and Dr. Shaista Grover.  Dr. Shaista Grover does not believe this patient is a risk to

## 2020-03-02 NOTE — ED NOTES
Pt reported suicidal thoughts upon arrival, but when asked the questions from the [address] [place] [address], pt answered \"no\" to both questions. Pt repeatedly requesting to be cleaned up immediately.  This RN continued to remind pt that we need to get th

## 2020-03-02 NOTE — ED NOTES
Pt reported that he feels neglected at [address] since they do not change his depends frequently enough. Pt reports that he thinks he has skin breakdown.  When this RN cleaned up pts perineal area, minimal skin breakdown noted, leading this RN to bel

## 2020-03-02 NOTE — ED INITIAL ASSESSMENT (HPI)
Care assumed from EMS. Per EMS, pt called 911, c/o suicidal thoughts. Pt states,  \"I'm just feeling pretty down today.  I'm feeling neglected today because they didn't change my diaper since 3:00PM and they didn't give me a dinner tray, so I'm wondering wh
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
## 2020-03-02 NOTE — ED NOTES
Pt dcd to [facility] per [facility], discharge instruction given and voices understanding, prescription given to ambulance crew, denies any concern

## 2020-03-02 NOTE — ED NOTES
This RN spoke to [employer] of Stephanie Palacios RN insisted that pt needs to be transferred to any psychiatric hospital, this RN told Lexii that pt denies any SI/HI to ER Physician and ER RN and pt refuse to speak to psych liaison, pt stated

## 2020-03-04 ENCOUNTER — HOSPITAL ENCOUNTER (EMERGENCY)
Facility: HOSPITAL | Age: 30
Discharge: HOME | End: 2020-03-04
Attending: EMERGENCY MEDICINE
Payer: MEDICAID

## 2020-03-04 VITALS
RESPIRATION RATE: 18 BRPM | TEMPERATURE: 98 F | OXYGEN SATURATION: 96 % | SYSTOLIC BLOOD PRESSURE: 138 MMHG | HEART RATE: 97 BPM | DIASTOLIC BLOOD PRESSURE: 101 MMHG | HEIGHT: 67 IN | WEIGHT: 225 LBS | BODY MASS INDEX: 35.31 KG/M2

## 2020-03-04 DIAGNOSIS — L30.9 DERMATITIS: Primary | ICD-10-CM

## 2020-03-04 DIAGNOSIS — L22 DIAPER RASH: ICD-10-CM

## 2020-03-04 PROCEDURE — 99283 EMERGENCY DEPT VISIT LOW MDM: CPT

## 2020-03-04 RX ORDER — CLOTRIMAZOLE 1 %
1 CREAM (GRAM) TOPICAL 2 TIMES DAILY
Qty: 113 G | Refills: 0 | Status: SHIPPED | OUTPATIENT
Start: 2020-03-04 | End: 2020-03-18

## 2020-03-05 NOTE — ED INITIAL ASSESSMENT (HPI)
Pt arrived via EMS from [facility] for complaint of laying in his own waste for the past 4 hours and wants to be cleaned. Pt states he has sacral wounds that he wants cleaned.

## 2020-03-05 NOTE — ED NOTES
Patient safe to DC back to [facility]. DC teaching done, instructions reviewed with patient including when and how to follow up with healthcare providers and when to seek emergency care. The patient verbalizes understanding.    [facility] here to take pt back to s

## 2020-03-05 NOTE — ED NOTES
Pt sts that he defecated at 3pm and he was not changed since then(pt is incontinent). Pt sts that he was calling but they were hanging up on him. Pt sts that nursing staff tends to bang on his feet ans laughs around.  Pt sts that he wants to complain about
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
## 2020-03-10 ENCOUNTER — HOSPITAL ENCOUNTER (EMERGENCY)
Facility: HOSPITAL | Age: 30
Discharge: HOME | End: 2020-03-10
Attending: EMERGENCY MEDICINE
Payer: MEDICARE

## 2020-03-10 VITALS
OXYGEN SATURATION: 96 % | HEART RATE: 99 BPM | RESPIRATION RATE: 17 BRPM | SYSTOLIC BLOOD PRESSURE: 108 MMHG | TEMPERATURE: 99 F | DIASTOLIC BLOOD PRESSURE: 80 MMHG

## 2020-03-10 DIAGNOSIS — H57.13 PAIN OF BOTH EYES: Primary | ICD-10-CM

## 2020-03-10 PROCEDURE — 99283 EMERGENCY DEPT VISIT LOW MDM: CPT

## 2020-03-10 RX ORDER — DIPHENHYDRAMINE HCL 25 MG
50 CAPSULE ORAL ONCE
Status: COMPLETED | OUTPATIENT
Start: 2020-03-10 | End: 2020-03-10

## 2020-03-10 NOTE — ED NOTES
Pt transported back to facility with [facility]. Pt in nad at this time. No iv access. Vss. Belongings with pt. All questions and concerns addressed.

## 2020-03-10 NOTE — ED INITIAL ASSESSMENT (HPI)
Right eye burning, itching and irritation. Pt states he receives an eye ointment for help with eye moistening, however, pt has not had it all day, which is why pt states he called 911.

## 2020-03-10 NOTE — ED PROVIDER NOTES
Patient Seen in: [facility] AND [facility] Emergency Department    History   Patient presents with: Eye Visual Problem      HPI    Patient presents to the ED complaining of right eye pain.   He states that his facility is out of Lacri-Lube and eyes been itchy si respiratory distress. Neurological: He is alert and oriented to person, place, and time. Skin: He is not diaphoretic. Psychiatric: He has a normal mood and affect.  His behavior is normal.       ED Course      Labs Reviewed - No data to display      I

## 2020-03-17 ENCOUNTER — HOSPITAL ENCOUNTER (EMERGENCY)
Facility: HOSPITAL | Age: 30
Discharge: HOME | End: 2020-03-17
Attending: EMERGENCY MEDICINE
Payer: MEDICARE

## 2020-03-17 VITALS
HEART RATE: 96 BPM | RESPIRATION RATE: 18 BRPM | SYSTOLIC BLOOD PRESSURE: 114 MMHG | TEMPERATURE: 98 F | OXYGEN SATURATION: 96 % | DIASTOLIC BLOOD PRESSURE: 71 MMHG

## 2020-03-17 DIAGNOSIS — F32.A DEPRESSION, UNSPECIFIED DEPRESSION TYPE: ICD-10-CM

## 2020-03-17 DIAGNOSIS — R73.9 HYPERGLYCEMIA: Primary | ICD-10-CM

## 2020-03-17 LAB
GLUCOSE BLDC GLUCOMTR-MCNC: 256 MG/DL (ref 70–99)
GLUCOSE BLDC GLUCOMTR-MCNC: 373 MG/DL (ref 70–99)
GLUCOSE BLDC GLUCOMTR-MCNC: 416 MG/DL (ref 70–99)
GLUCOSE BLDC GLUCOMTR-MCNC: 426 MG/DL (ref 70–99)

## 2020-03-17 PROCEDURE — 82962 GLUCOSE BLOOD TEST: CPT

## 2020-03-17 PROCEDURE — 96372 THER/PROPH/DIAG INJ SC/IM: CPT

## 2020-03-17 PROCEDURE — 99284 EMERGENCY DEPT VISIT MOD MDM: CPT

## 2020-03-17 RX ORDER — INSULIN ASPART 100 [IU]/ML
8 INJECTION, SOLUTION INTRAVENOUS; SUBCUTANEOUS ONCE
Status: COMPLETED | OUTPATIENT
Start: 2020-03-17 | End: 2020-03-17

## 2020-03-17 RX ORDER — LORAZEPAM 1 MG/1
1 TABLET ORAL ONCE
Status: COMPLETED | OUTPATIENT
Start: 2020-03-17 | End: 2020-03-17

## 2020-03-17 RX ORDER — INSULIN ASPART 100 [IU]/ML
14 INJECTION, SOLUTION INTRAVENOUS; SUBCUTANEOUS ONCE
Status: COMPLETED | OUTPATIENT
Start: 2020-03-17 | End: 2020-03-17

## 2020-03-17 RX ORDER — ACETAMINOPHEN 500 MG
1000 TABLET ORAL ONCE
Status: COMPLETED | OUTPATIENT
Start: 2020-03-17 | End: 2020-03-17

## 2020-03-17 NOTE — CM/SW NOTE
[facility] transport arranged to bring Pt back to [address] [facility]. Eta 45 min. PCD form competed.

## 2020-03-17 NOTE — ED INITIAL ASSESSMENT (HPI)
Pt states he did not receive night medications at 2100 or at 2300, as stated by facility nurse. Pt feeling down. Pt denies si/hi at bedside.

## 2020-03-17 NOTE — ED PROVIDER NOTES
Patient Seen in: [facility] AND [facility] Emergency Department      History   Patient presents with:  Cassie-ROSIE    Stated Complaint: SI    HPI    27 y/o male from [place] a well-known to this emergency department with recent diagnosis of diabetes on metformin he Effort normal. No respiratory distress. Abdominal: Soft. There is no tenderness. There is no guarding. Musculoskeletal: Normal range of motion. No edema or tenderness. Neurological: Alert and oriented to person, place, and time.    Skin: Skin is warm

## 2020-03-19 NOTE — ED NOTES
Assumed care of patient. Breakfast ordered for patient. He reports \"I'm still having \"hallucinations. More like sensing things, but it's a little bit better. \"

## 2020-03-19 NOTE — ED PROVIDER NOTES
Patient Seen in: [facility] Emergency Department      History   Patient presents with:  Checkup    Stated Complaint: hallucinations    HPI    41-year-old male with history of encephalitis, hemiplegia, TBI, depression, here after hallucinating in h Resp 17   Temp 98.8 °F (37.1 °C)   Temp src Oral   SpO2 95 %   O2 Device None (Room air)       Current:BP (!) 117/91   Pulse 92   Temp 98.8 °F (37.1 °C) (Oral)   Resp 16   Ht 170.2 cm (5' 7\")   Wt 106.6 kg   SpO2 97%   BMI 36.81 kg/m²         Physical E vitals  - afebrile, hemodynamically stable  - ativan ordered  - supportive care discussed    Medical Record Review: I personally reviewed available prior medical records for any recent pertinent discharge summaries, testing, and procedures, and reviewed th

## 2020-03-19 NOTE — ED INITIAL ASSESSMENT (HPI)
Patient woke from sleep after dreaming and states he was still seeing snakes and it took him to a bad place in his memory. Patient denies SI/HI.

## 2020-03-30 ENCOUNTER — HOSPITAL ENCOUNTER (EMERGENCY)
Facility: HOSPITAL | Age: 30
Discharge: HOME | End: 2020-03-31
Attending: EMERGENCY MEDICINE
Payer: MEDICARE

## 2020-03-30 VITALS
BODY MASS INDEX: 36.68 KG/M2 | RESPIRATION RATE: 18 BRPM | TEMPERATURE: 99 F | HEIGHT: 67 IN | HEART RATE: 96 BPM | SYSTOLIC BLOOD PRESSURE: 130 MMHG | WEIGHT: 233.69 LBS | DIASTOLIC BLOOD PRESSURE: 93 MMHG | OXYGEN SATURATION: 95 %

## 2020-03-30 DIAGNOSIS — L02.214 ABSCESS OF GROIN, RIGHT: Primary | ICD-10-CM

## 2020-03-30 PROCEDURE — 99283 EMERGENCY DEPT VISIT LOW MDM: CPT

## 2020-03-30 RX ORDER — SULFAMETHOXAZOLE AND TRIMETHOPRIM 800; 160 MG/1; MG/1
1 TABLET ORAL 2 TIMES DAILY
Qty: 20 TABLET | Refills: 0 | Status: SHIPPED | OUTPATIENT
Start: 2020-03-30 | End: 2020-04-09

## 2020-03-31 NOTE — ED PROVIDER NOTES
Patient Seen in: [facility] Emergency Department    History   Patient presents with:  Abscess      HPI    Patient presents to the ED complaining of a painful bump to his right groin for the past 2 days. No fevers chills or other complaints.   Beyond Oblivion throughout the duration of the exam.  Handwashing was performed prior to and after the exam.  Stethoscope and any equipment used during my examination was cleaned with super sani-cloth germicidal wipes following the exam.     Physical Exam   Constitutional strongly advised incision and drainage in the ED with the patient. He is apprehensive about doing this however I would prefer antibiotic treatment alone.   I had a long discussion regarding the appropriate treatment of his abscess and strongly recommended

## 2020-04-11 ENCOUNTER — HOSPITAL ENCOUNTER (EMERGENCY)
Facility: HOSPITAL | Age: 30
Discharge: HOME | End: 2020-04-12
Attending: EMERGENCY MEDICINE
Payer: MEDICARE

## 2020-04-11 DIAGNOSIS — B37.49 CANDIDIASIS OF SCROTUM: ICD-10-CM

## 2020-04-11 DIAGNOSIS — N49.9 CELLULITIS OF MALE GENITALIA: Primary | ICD-10-CM

## 2020-04-11 PROCEDURE — 99283 EMERGENCY DEPT VISIT LOW MDM: CPT

## 2020-04-12 VITALS
WEIGHT: 236 LBS | SYSTOLIC BLOOD PRESSURE: 130 MMHG | BODY MASS INDEX: 37.04 KG/M2 | OXYGEN SATURATION: 98 % | DIASTOLIC BLOOD PRESSURE: 60 MMHG | HEART RATE: 88 BPM | TEMPERATURE: 98 F | RESPIRATION RATE: 16 BRPM | HEIGHT: 67 IN

## 2020-04-12 RX ORDER — CLOTRIMAZOLE 1 %
1 CREAM (GRAM) TOPICAL 2 TIMES DAILY
Qty: 45 G | Refills: 0 | Status: SHIPPED | OUTPATIENT
Start: 2020-04-12 | End: 2020-05-16

## 2020-04-12 RX ORDER — ACETAMINOPHEN 500 MG
1000 TABLET ORAL ONCE
Status: COMPLETED | OUTPATIENT
Start: 2020-04-12 | End: 2020-04-12

## 2020-04-12 RX ORDER — DOXYCYCLINE HYCLATE 100 MG/1
100 CAPSULE ORAL 2 TIMES DAILY
Qty: 20 CAPSULE | Refills: 0 | Status: SHIPPED | OUTPATIENT
Start: 2020-04-12 | End: 2020-04-22

## 2020-04-12 RX ORDER — LIDOCAINE HYDROCHLORIDE 20 MG/ML
10 JELLY TOPICAL ONCE
Status: COMPLETED | OUTPATIENT
Start: 2020-04-12 | End: 2020-04-12

## 2020-04-12 NOTE — ED PROVIDER NOTES
Patient Seen in: [facility] AND [facility] Emergency Department      History   Patient presents with:  Rash Skin Problem    Stated Complaint: Groin pain    HPI    76-year-old male with  history of encephalitis, hemiplegia, TBI, depressions, here with complaints BP 04/11/20 2343 (!) 134/91   Pulse 04/11/20 2343 97   Resp 04/11/20 2343 18   Temp 04/11/20 2344 98.3 °F (36.8 °C)   Temp src 04/11/20 2344 Oral   SpO2 04/11/20 2343 96 %   O2 Device 04/11/20 2343 None (Room air)       Current:BP (!) 134/91   Pulse 97 Chloride 97 (L) 98 - 112 mmol/L    CO2 26.0 21.0 - 32.0 mmol/L    Anion Gap 11 0 - 18 mmol/L    BUN 12 7 - 18 mg/dL    Creatinine 1.04 0.70 - 1.30 mg/dL    BUN/CREA Ratio 11.5 10.0 - 20.0    Calcium, Total 9.3 8.5 - 10.1 mg/dL    Calculated Osmolality 294 Patient's condition was stable during Emergency Department evaluation.      29yoM with rash  - I personally reviewed and interpreted all the ED vitals  - afebrile, hemodynamically stable  - urojet applied for pain control  - supportive care discussed      T obtain basic health screening including reassessment of your blood pressure.       Medications Prescribed:  Current Discharge Medication List    START taking these medications    Doxycycline Hyclate 100 MG Oral Cap  Take 1 capsule (100 mg total) by mouth 2

## 2020-04-12 NOTE — ED NOTES
Pt brought in by EMS from [facility]. C/O pain and itching on testicles/scrotum that has worsened over the past 2 days. C/O pain 8/10 at this time.

## 2020-04-12 NOTE — ED NOTES
Groin/testicles/scrotum red and excoriated. Area cleansed with soap and water. Lidocaine gel applied for 1 minute per MD order.

## 2020-04-12 NOTE — ED NOTES
Patient is resting comfortably. Updated with ETA of transport back to [facility].

## 2020-04-13 ENCOUNTER — HOSPITAL ENCOUNTER (EMERGENCY)
Facility: HOSPITAL | Age: 30
Discharge: HOME | End: 2020-04-13
Attending: EMERGENCY MEDICINE
Payer: MEDICARE

## 2020-04-13 VITALS
HEART RATE: 100 BPM | BODY MASS INDEX: 37 KG/M2 | SYSTOLIC BLOOD PRESSURE: 124 MMHG | TEMPERATURE: 99 F | DIASTOLIC BLOOD PRESSURE: 84 MMHG | WEIGHT: 236 LBS | RESPIRATION RATE: 20 BRPM | OXYGEN SATURATION: 96 %

## 2020-04-13 DIAGNOSIS — B37.2 CANDIDAL DIAPER DERMATITIS: Primary | ICD-10-CM

## 2020-04-13 DIAGNOSIS — L22 CANDIDAL DIAPER DERMATITIS: Primary | ICD-10-CM

## 2020-04-13 PROCEDURE — 99283 EMERGENCY DEPT VISIT LOW MDM: CPT

## 2020-04-13 RX ORDER — CLOTRIMAZOLE 1 %
CREAM (GRAM) TOPICAL ONCE
Status: COMPLETED | OUTPATIENT
Start: 2020-04-13 | End: 2020-04-13

## 2020-04-13 RX ORDER — CLOTRIMAZOLE 1 %
1 CREAM (GRAM) TOPICAL 2 TIMES DAILY
Qty: 24 G | Refills: 0 | Status: SHIPPED | OUTPATIENT
Start: 2020-04-13 | End: 2020-05-16

## 2020-04-14 NOTE — ED PROVIDER NOTES
Patient Seen in: [facility] Emergency Department      History   Patient presents with:  Rash Skin Problem    Stated Complaint: rash, no one to clean patient up at NH    HPI    59-year-old male with past medical history significant for ADHD, hemipl b/l  Nose: Nose normal.   Mouth/Throat: MMM, post OP clear with no exudates  Eyes: Conjunctivae normal.   Neck: Normal range of motion. Neck supple. No tracheal deviation present.    CV: s1s2+, RRR, no m/r/g, normal distal pulses  Pulmonary/Chest: CTA b/l w

## 2020-04-14 NOTE — ED NOTES
Attempted report x3 to [facility] with no answer. Patient notes he is more comfortable after being changed. Awaiting cream from pharmacy for groin.

## 2020-04-14 NOTE — ED INITIAL ASSESSMENT (HPI)
Patient states rash to groin since last night. Patient states he was incontinent and no one was able to clean him up at [facility]. Patient from [address]    Seen here 2 days ago.

## 2020-04-20 ENCOUNTER — HOSPITAL ENCOUNTER (EMERGENCY)
Facility: HOSPITAL | Age: 30
Discharge: HOME | End: 2020-04-21
Attending: EMERGENCY MEDICINE
Payer: MEDICARE

## 2020-04-20 DIAGNOSIS — F32.A DEPRESSION, UNSPECIFIED DEPRESSION TYPE: Primary | ICD-10-CM

## 2020-04-20 DIAGNOSIS — E11.65 TYPE 2 DIABETES MELLITUS WITH HYPERGLYCEMIA, WITHOUT LONG-TERM CURRENT USE OF INSULIN (HCC): ICD-10-CM

## 2020-04-20 PROCEDURE — 80329 ANALGESICS NON-OPIOID 1 OR 2: CPT | Performed by: EMERGENCY MEDICINE

## 2020-04-20 PROCEDURE — 80320 DRUG SCREEN QUANTALCOHOLS: CPT | Performed by: EMERGENCY MEDICINE

## 2020-04-20 PROCEDURE — 80307 DRUG TEST PRSMV CHEM ANLYZR: CPT | Performed by: EMERGENCY MEDICINE

## 2020-04-20 PROCEDURE — 99285 EMERGENCY DEPT VISIT HI MDM: CPT

## 2020-04-20 PROCEDURE — 80048 BASIC METABOLIC PNL TOTAL CA: CPT | Performed by: EMERGENCY MEDICINE

## 2020-04-20 PROCEDURE — 85025 COMPLETE CBC W/AUTO DIFF WBC: CPT | Performed by: EMERGENCY MEDICINE

## 2020-04-20 PROCEDURE — 36415 COLL VENOUS BLD VENIPUNCTURE: CPT

## 2020-04-20 RX ORDER — LORAZEPAM 1 MG/1
2 TABLET ORAL ONCE
Status: COMPLETED | OUTPATIENT
Start: 2020-04-20 | End: 2020-04-20

## 2020-04-21 VITALS
SYSTOLIC BLOOD PRESSURE: 108 MMHG | HEART RATE: 88 BPM | WEIGHT: 235.88 LBS | TEMPERATURE: 98 F | RESPIRATION RATE: 20 BRPM | DIASTOLIC BLOOD PRESSURE: 72 MMHG | OXYGEN SATURATION: 95 % | BODY MASS INDEX: 37 KG/M2

## 2020-04-21 NOTE — ED NOTES
RN to RN report given to Noel Mills at [facility] for continuation of care and outpatient psychiatric follow-up with his Linnea Bellamy psychiatrist. Resources given to patient and reviewed with Linnea Bellamy RN for psychiatric care and crisis lines.  The patient is sha
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
## 2020-04-21 NOTE — ED INITIAL ASSESSMENT (HPI)
The patient arrived via EMS from [facility] with complaint of depression and suicidal ideation.  The patient reports that he has become increasingly depressed and has been thinking about ways that he can kill himself such as \"using a butter knife or

## 2020-04-21 NOTE — BH LEVEL OF CARE ASSESSMENT
Level of Care Assessment Note    General Questions  Why are you here?: \"I feel better, I feel calm, I am optimistic. I am not suicidal anymore. Can I go home now? I want to call my brother\".   Precipitating Events: Patient was getting upset about how a st case and he was starting to become more agitated in the nursing home. She was the one to contacted 911. Family's Biggest Areas of Concern: She states that the patient is able to return to the nursing home if that is our recommendation.  Patient did not co Physical Contact: No  Have you ever damaged/destroyed property or thought about it?: No    Access to Means  Has access to means to attempt suicide or harm others or property: Yes  Description of Access: Some household items  Discussion of Removal of Access Weight Loss: No  Unplanned Weight Gain: No  History of Eating Disorder: No  Active Eating Disorder: No    IBW Calculations  Weight: 235 lb 14.3 oz Patient requires assistance with dressing, tolieting and setting up food for him.)  Do you have any prior/current legal concerns?: None  History of Gang Involvement: No  Type of Residence: [place] Name: Zurdo Meyers at [facility] in [address] [facility] insight as evidenced by: Patient has some insight into sxs and does express he shouldn't say things in the moment   Judgment: [facility] judgment as evidenced by: Patient is compliant with treatment and is actively wanting to participate in treatment he doesn't have anyone to talk to and that the staff doesn't answer his call button.  Patient does have insight into his symptoms and is able to identify positive supports/coping mechanisms however states that he can get angry in the moment and not use them questions  Transferred: No    Primary Psychiatric Diagnosis  Depressive Disorders: Persistent Depressive Disorder (Dysthymia)  Secondary Psychiatric Diagnoses  Trauma and Stressor Related Disorders: Posttraumatic Stress Disorder        Pertinent Non-psychi

## 2020-04-30 PROBLEM — T50.902A INTENTIONAL DRUG OVERDOSE, INITIAL ENCOUNTER (HCC): Status: ACTIVE | Noted: 2020-04-30

## 2020-04-30 PROBLEM — T50.902A SUICIDAL OVERDOSE, INITIAL ENCOUNTER (HCC): Status: ACTIVE | Noted: 2020-04-30

## 2020-04-30 PROBLEM — R73.9 HYPERGLYCEMIA: Status: ACTIVE | Noted: 2020-04-30

## 2020-04-30 PROBLEM — F33.2 SEVERE RECURRENT MAJOR DEPRESSION WITHOUT PSYCHOTIC FEATURES (HCC): Status: ACTIVE | Noted: 2020-04-30

## 2020-04-30 PROBLEM — T50.902A INTENTIONAL DRUG OVERDOSE (HCC): Status: ACTIVE | Noted: 2020-04-30

## 2020-04-30 PROBLEM — F43.12 CHRONIC POST-TRAUMATIC STRESS DISORDER (PTSD): Status: ACTIVE | Noted: 2020-04-30

## 2020-04-30 PROBLEM — E11.65 TYPE 2 DIABETES MELLITUS WITH HYPERGLYCEMIA, UNSPECIFIED WHETHER LONG TERM INSULIN USE (HCC): Status: ACTIVE | Noted: 2020-04-30

## 2020-04-30 NOTE — ED NOTES
[facility] at the suggestion of another facility. Spoke to Sarah Tim. They do not have a med-psych unit.

## 2020-04-30 NOTE — ED NOTES
Pt on call light multiple times. Provided comfort measures. Pt given fresh ice water.    Pt given extra cup of soup per his request.

## 2020-04-30 NOTE — ED NOTES
Called [facility]. Spoke to ANNA.   They do not have a med-psych unit and are unable to accommodate

## 2020-04-30 NOTE — ED NOTES
[address] to [facility] in Int[place]. They were willing to consider medical complications. However, [facility] informed me that they only accept Voluntary patients.   Met with Tiffanie Crowder, explained that I am working on a psychiatric admission for h

## 2020-04-30 NOTE — BH LEVEL OF CARE ASSESSMENT
Level of Care Assessment Note    General Questions  Why are you here?: \"I drank a bottle of eye drops. Precipitating Events: Pt reports drinking a bottle of eye drops in an attempt to kill himself.  Pt states he was researching on [employer] and learned that kill yourself? (past 30 days): Yes  4. Have you had these thoughts and had some intention of acting on them? (past 30 days): Yes  5a. Have you started to work out or worked out the details of how to kill yourself? (past 30 days): Yes  5b.  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, and has phantom eye syndrome )  Patient able to understand and follow directions?: Yes  Patient able to express needs?: Yes  Feeding and Swallowing  History of aspiration or choking?: No  Feeding assistance roseanne illicit/prescription drugs have you used/abused?: Denies                                                                Support for Recovery  Is your living environment a supportive place for recovery?: Yes  Describe: Pt lives in a nursing home      Withdr Unimpaired(Pt able to focus for entire assessment)  Memory: Recent memory intact; Remote memory intact  Orientation Level: Oriented X4  Insight: Poor  Fair/poor insight as evidenced by: Pt does not possess insight into her mental health   Judgment: Poor  Fa Unit Assigned: Age / Symptoms   Inpatient Criteria: 24 hr behavior monitoring; Failure at lowest level of care;Suicidal/homicidal risk  Behavioral Precautions: Close Observation;Suicide  Medical Precautions: None  Refused Treatment: No  Transferred:  Yes

## 2020-04-30 NOTE — ED NOTES
Spoke with Manoj Peres from poison control, informed RN that they were closing out the case at this time.

## 2020-04-30 NOTE — ED NOTES
Pt sleeping. Bed is in low & locked position. Call light in reach. ER tech outside room for seclusion. Pt aware of plan of care. Will continue to monitor.

## 2020-04-30 NOTE — ED NOTES
Consulted with Dr Elena Zaidi. Due to difficulty on waking up [place] long enough to interview, we will attempt tele-conference later today.

## 2020-04-30 NOTE — ED PROVIDER NOTES
Pt endorsed to me. BS elevated, will administer insulin. No option for psychiatric admission due to significant comorbid medical problems. Discussed with Dr. Paty Bradley.

## 2020-04-30 NOTE — ED NOTES
Call back from [place] with [facility].  Due to their medical acuity they are unable to accept the patient.

## 2020-04-30 NOTE — ED NOTES
Call back from Neha Almonte at [facility].  She advised that a change went into effect since we spoke earlier today and they are no longer accepting outside transfers to their facility due to Vincent concerns.

## 2020-04-30 NOTE — BH LEVEL OF CARE ASSESSMENT
Level of Care Assessment Note    General Questions  Why are you here?: \"I drank a bottle of eye drops. Precipitating Events: Pt reports drinking a bottle of eye drops in an attempt to kill himself.  Pt states he was researching on [employer] and learned that kill yourself? (past 30 days): Yes  4. Have you had these thoughts and had some intention of acting on them? (past 30 days): Yes  5a. Have you started to work out or worked out the details of how to kill yourself? (past 30 days): Yes  5b.  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, and has phantom eye syndrome )  Patient able to understand and follow directions?: Yes  Patient able to express needs?: Yes  Feeding and Swallowing  History of aspiration or choking?: No  Feeding assistance roseanne illicit/prescription drugs have you used/abused?: Denies                                                                Support for Recovery  Is your living environment a supportive place for recovery?: Yes  Describe: Pt lives in a nursing home      Withdr Unimpaired(Pt able to focus for entire assessment)  Memory: Recent memory intact; Remote memory intact  Orientation Level: Oriented X4  Insight: Poor  Fair/poor insight as evidenced by: Pt does not possess insight into her mental health   Judgment: Poor  Fa Unit Assigned: Age / Symptoms   Inpatient Criteria: 24 hr behavior monitoring; Failure at lowest level of care;Suicidal/homicidal risk  Behavioral Precautions: Close Observation;Suicide  Medical Precautions: None  Refused Treatment: No  Transferred:  Yes

## 2020-04-30 NOTE — ED NOTES
[facility] to Adriana Grey. Preliminary information provided. Referral packet faxed for review.

## 2020-04-30 NOTE — ED NOTES
TRANSFER SUMMARY:    [facility] has a  med psych unit, but did not get back to me. Darryle Sniff takes patient with some medical issues but unable to accommodate level of care needed.     [facility] is unable to accommodate    [facility] is Jhon Nixon

## 2020-04-30 NOTE — ED PROVIDER NOTES
Patient Seen in: [facility] AND [facility] Emergency Department      History   Patient presents with:  Poisoning/Overdose    Stated Complaint: Suicidal    HPI    24-year-old male with history of TBI, seizure, here after suicide attempt 30 minutes prior to [facility] headaches. Psychiatric/Behavioral: Positive for suicidal ideas. All other systems reviewed and are negative. Positive for stated complaint: Suicidal  Other systems are as noted in HPI. Constitutional and vital signs reviewed.       All other syste 88 >=60    GFR, -American 101 >=60   ETHYL ALCOHOL    Collection Time: 04/29/20 11:30 PM   Result Value Ref Range    Ethyl Alcohol <3 <3 mg/dL   CBC W/ DIFFERENTIAL    Collection Time: 04/29/20 11:30 PM   Result Value Ref Range    WBC 8.7 4.0 - 11. 0 evaluation.       EMERGENCY DEPARTMENT MEDICAL DECISION MAKING:  After obtaining the patient's history, performing the physical exam and reviewing the diagnostics, multiple initial diagnoses were considered based on the presenting problem including SI, HI,

## 2020-04-30 NOTE — ED NOTES
Pt now awake and stating \"i'm ready to go home now. Can you discharge me back to [facility]? \". This RN reoriented pt to the situation and reminded him that he tried to kill himself so he is currently waiting to be admitted to an inpatient psych facility.  Pt co

## 2020-04-30 NOTE — ED NOTES
Called TAN Bradley. Spoke to [employer]. [facility] is unable to accommodate. Arnaldo Patel is  unable to accommodate. [facility] is unable to accommodate.   Ryder Pardo takes some patient's with medical issues but would be unable to accommodate the level of car

## 2020-04-30 NOTE — ED NOTES
[address] to [facility] in Intake. Requested clarification/addiitonal information regarding the COVID testing. [facility] explained that they no longer accept the rapid test results anymore as they have had received negative results and then

## 2020-04-30 NOTE — ED NOTES
Spoke to Kath Hunter at poison control Refresh tears carboxymethylcellulose sodium is nontoxic, poison control will callback in 2 hrs to see if any acute changes.

## 2020-04-30 NOTE — ED NOTES
Pt asleep. Woken to verbal stimulus by RN. Pt aware of need for urine sample, states that he cannot urinate at this time. Will continue to monitor.

## 2020-04-30 NOTE — ED NOTES
Pt woke up and asking to go home. RN reminded pt that he will be admitted to an inpatient psychiatric facility d/t his attempt at suicide. Pt continues to ask if he can go home, despite RN's numerous explanations.

## 2020-04-30 NOTE — ED NOTES
Patient has been pressing the call light every 5 minutes. Staff has continuously answered and cared for patient.

## 2020-04-30 NOTE — ED NOTES
Updated Kiana Ramires at  [facility] that Torey Bernstein is not willing to sign in. They are unable to acommodate.

## 2020-04-30 NOTE — ED INITIAL ASSESSMENT (HPI)
Pt states, \"I looked up on my phone that if I drink a bottle of eyedrops that I can die so I did it there was no nurse to talk to about my suicidal thoughts at the nursing home. \" Pt is awake, respirations nonlabored, reports that his throat is tight, is

## 2020-04-30 NOTE — ED NOTES
Orders for admission, patient is aware of plan and ready to go upstairs. Any questions, please call ED RN Traci Diaz  at extension [phone].

## 2020-04-30 NOTE — ED NOTES
Received phone call from Shani Dutta, [facility] at [facility] at [facility]. Kenyatta Gutierres RN on pts plan of care. Pt remains asleep. Breakfast tray at bedside. Will continue to monitor.

## 2020-04-30 NOTE — ED NOTES
Spoke to [employer] lab at ext [phone]. Advised the the first batch gets sent at 8 a.m. so the result would not be ready until after 4 p.m.

## 2020-04-30 NOTE — ED NOTES
[facility].  Informed they do have a med psych unit. Awaiting a call back from Intake.

## 2020-04-30 NOTE — ED NOTES
Call back from [facility] AT [facility] with [facility].  They are unable to accommodate.

## 2020-04-30 NOTE — ED NOTES
[address] [facility] [address] - faxed documentation showing that the COVID test is in process. Also faxed the drug screen.

## 2020-04-30 NOTE — ED NOTES
TRANSFER SUMMARY:    [id number] Rehan Chávez taking outside transfers today due to [place] concerns    [facility] patients and Wendi Longo stated he will not sign in as a Voluntary patient    [facility] is unable to

## 2020-04-30 NOTE — ED NOTES
Teleconference completed with Dr Rodrigues Fairly. Patient was advised that the recommendation remains that he be hospitalized psychiatrically.

## 2020-05-01 NOTE — PLAN OF CARE
Problem: Patient Centered Care  Goal: Patient preferences are identified and integrated in the patient's plan of care  Description  Interventions:  - What would you like us to know as we care for you?  I want to go home  - Provide timely, complete, and ac

## 2020-05-01 NOTE — CM/SW NOTE
Patient comes from [facility] [place] [address] and has been tested for COVID-19. This patient's COVID-19 test is negative. At the time of this note, there are  known positive cases in that facility.     Issa Gracia RN    Ext [phone]

## 2020-05-01 NOTE — CONSULTS
[facility]    Report of Consultation    Javier Mathew Patient Status:  Emergency    10/3/1990 MRN [id number]   Location [address] Attending Riley Pabon MD   Hosp Day # 0 PCP Sunshine Blanc     Date depression and suicidality make him drink the eyedrop and patient found and admitted. The patient today reported that he is anxious to go anywhere and he does not want to go to inpatient psych facility.   Patient is unable to understand the seriousness o UNKNOWN  Megace [Megestrol]      UNKNOWN  Shellfish-Derived P*    UNKNOWN  Vancomycin              UNKNOWN  Amoxicillin             RASH  Cefepime                RASH  Latex                   RASH      Review of Systems:     As by Admitting/Attending    Re feature. Chronic posttraumatic stress disorder.   TBI        The patient with history of TBI and traumatic event impact his life and ability to live with good the quality, subsequently with severe depression and multiple suicidal ideation but this is first

## 2020-05-01 NOTE — H&P
Gómez Munoz is a 26year old  male with history of TBI, seizure, PTSD and depression who presented to the hospital with intentional overdose.    The patient seen today via telehealth for over 35-minute, follow-up evaluation, over 50% counseling a restriction and reservation. Patient otherwise have a psychiatrist whom he is seeing in the nursing home and have a therapist visit him twice weekly. Patient denies any side effect to the medication.   Depakote has been introduced yesterday to take over MG/ML injection 1 mg, 1 mg, Intravenous, Q6H PRN  artificial tears 83-15 % ophthalmic ointment 1 Application, 1 Application, Ophthalmic, Q2H PRN  Senna-Docusate Sodium (SENOKOT S) 8.6-50 MG tab 1 tablet, 1 tablet, Oral, BID  docusate sodium (COLACE) cap 10 05/01/2020    ALB 3.6 05/01/2020    ALKPHO 106 05/01/2020    TP 7.6 05/01/2020    AST 54 (H) 05/01/2020     (H) 05/01/2020     05/12/2019    MG 1.8 06/12/2019    CK 60 07/02/2019    ETOH <3 04/29/2020         Imaging        Vitals   05/01/20 Discontinue certificate and petition. 2.  Focus on education and support. 3.  Psychotherapy focusing on insight orientation and restructuring the negative thought. 4.  Continue Seroquel 100 mg nightly. 5.  Continue trazodone 200 mg nightly.   6.  Contin

## 2020-05-01 NOTE — H&P
[address] Escobedo Sylvia Patient Status:  Inpatient    10/3/1990 MRN [id number]   Location [address] [facility] Attending Fidelia [place], *   Hosp Day # 1 JAISON Sosa     Date:  2020  Date of A Comment:maculopapular rash.   Recommend against             re-challenge  Fish-Derived Produc*    UNKNOWN, RASH  Latex                   RASH    Comment:Other reaction(s): Drug fever  Shellfish-Derived P*    UNKNOWN, RASH  Vancomycin              UNKNOWN, R meals.  gabapentin 600 MG Oral Tab, Take 600 mg by mouth every 8 (eight) hours. levETIRAcetam 500 MG Oral Tab, Take 500 mg by mouth 2 (two) times daily. Enoxaparin Sodium (LOVENOX) 40 MG/0.4ML Subcutaneous Solution, Inject 40 mg into the skin daily.   mag  (H) 05/01/2020     05/12/2019    MG 1.8 06/12/2019    CK 60 07/02/2019    ETOH <3 04/29/2020                 Assessment/Plan:        Intentional overdose of eyedrops  –No symptomatic clinical abnormalities    Major depression with suicidal i

## 2020-05-01 NOTE — ED NOTES
Patient transferred to floor with transport, and security. Belongings, chart and tray sent with patient.

## 2020-05-01 NOTE — CM/SW NOTE
SHEA received MDO for DC planning. SW performed chart review, psych is on consult due to pt's recent suicide attempt. Per Chart review, pt will be discharging to an inpatient psychiatric hospital. Psych to continue to follow the pt.  SHEA to complete and cl

## 2020-05-02 NOTE — CM/SW NOTE
Case Management/ Progression of Care  12:54  Discharge order received for patient to return to  [facility]. DON contacted to determine bed availability and acceptance. Informed this CM they will call me back.        SW/CM to remain available for s

## 2020-05-02 NOTE — PROGRESS NOTES
Milan Miner is a 26year old  male with history of TBI, seizure, PTSD and depression who presented to the hospital with intentional overdose.    The patient seen today via telehealth for over 35-minute, follow-up evaluation, over 50% counseling a Seizure disorder ([facility]    • TBI (traumatic brain injury) ([facility]       History reviewed. No pertinent surgical history. History reviewed. No pertinent family history.    Social History: Social History    Tobacco Use      Smoking status: Former Smoker      S (DEX-4) chewable tab 8 tablet, 8 tablet, Oral, Q15 Min PRN  0.9% NaCl infusion, , Intravenous, Continuous  HYDROcodone-acetaminophen (NORCO) 5-325 MG per tab 1 tablet, 1 tablet, Oral, Q6H PRN        Allergies:    Megestrol               UNKNOWN, HALLUCINAT and cannot hurt his mom. Patient denying any auditory or visual hallucination. Cognition presented fairly appropriate for his medical condition.   Judgment and insight are questionable due to the frequent admission to the emergency room and the consistent (14)      SARS-CoV-2 by PCR Once      Emergency MRSA Screen by PCR Once      Rapid SARS-CoV-2 by PCR STAT      Clostridium difficile(toxigenic)PCR Once      Meds This Visit:  Requested Prescriptions     Signed Prescriptions Disp Refills   • divalproex Sodi

## 2020-05-02 NOTE — CM/SW NOTE
05/02/20 1408   Discharge disposition   Name of Facillity/Home Care/Hospice [facility]   Discharge transportation [facility]   MDO received for discharge,to return to [facility] in [place].  THis Cm spoke with Patient and is brother KALI and bothe a

## 2020-05-02 NOTE — PLAN OF CARE
Problem: Patient Centered Care  Goal: Patient preferences are identified and integrated in the patient's plan of care  Description  Interventions:  - What would you like us to know as we care for you?  \"I want to go back to [facility] today\"  - Provide timely Adequate for Discharge   Denies SI. Report given to Ray wasserman RN. Psychiatric medications and insulin regimen reviewed in depth with Benito and patient. Prescriptions sent with discharge paperwork. Plans to d/c to [facility] this afternoon via ambulance.  Discharged in s

## 2020-05-04 ENCOUNTER — HOSPITAL ENCOUNTER (EMERGENCY)
Facility: HOSPITAL | Age: 30
Discharge: HOME | End: 2020-05-05
Attending: EMERGENCY MEDICINE
Payer: MEDICARE

## 2020-05-04 DIAGNOSIS — F32.A DEPRESSION, UNSPECIFIED DEPRESSION TYPE: Primary | ICD-10-CM

## 2020-05-04 PROCEDURE — 99284 EMERGENCY DEPT VISIT MOD MDM: CPT

## 2020-05-05 VITALS
SYSTOLIC BLOOD PRESSURE: 138 MMHG | TEMPERATURE: 98 F | WEIGHT: 204.13 LBS | HEART RATE: 95 BPM | HEIGHT: 67 IN | DIASTOLIC BLOOD PRESSURE: 90 MMHG | BODY MASS INDEX: 32.04 KG/M2 | OXYGEN SATURATION: 97 % | RESPIRATION RATE: 16 BRPM

## 2020-05-05 NOTE — ED NOTES
Assumed care of [facility] upon arrival in room 23 via triage. The patient reports he is feeling \"down and depressed. \" he reports he was here last week and tried to kill himself by drinking eye drops. Tonight he is feeling suicidal without a specific plan.

## 2020-05-05 NOTE — ED INITIAL ASSESSMENT (HPI)
Patient states he is feeling \"down and depressed and having suicidal thoughts. \" Patient denies current plan.

## 2020-05-05 NOTE — ED PROVIDER NOTES
Patient Seen in: [facility] AND [facility] Emergency Department    History   Patient presents with:  Eval-P      HPI    Patient presents to the ED complaining of feeling depressed and occasionally suicidal.  He states symptoms come and go, worse currently.   Mary Blank Personal protective equipment including droplet mask, eye protection, and gloves were worn throughout the duration of the exam.  Handwashing was performed prior to and after the exam.  Stethoscope and any equipment used during my examination was cleaned wi with depression. Exercise all thoughts. Patient states that he has no intention of acting on these thoughts. After evaluation patient would like to go back to his care facility. Stable to do so.     Additional verbal instructions and return precautions

## 2020-05-05 NOTE — ED NOTES
Patient discharged to care of [facility] in no acute distress, Simin is A&O to norm at this time. Denies physical complaints. States he is depressed but would not act on any suicidal thoughts to end his life.  He verbalized understanding of d/c instruc

## 2020-05-15 ENCOUNTER — HOSPITAL ENCOUNTER (EMERGENCY)
Facility: HOSPITAL | Age: 30
Discharge: HOME | End: 2020-05-15
Attending: EMERGENCY MEDICINE
Payer: MEDICARE

## 2020-05-15 VITALS
RESPIRATION RATE: 20 BRPM | SYSTOLIC BLOOD PRESSURE: 128 MMHG | WEIGHT: 204 LBS | HEART RATE: 96 BPM | DIASTOLIC BLOOD PRESSURE: 78 MMHG | TEMPERATURE: 99 F | OXYGEN SATURATION: 94 % | BODY MASS INDEX: 32.02 KG/M2 | HEIGHT: 67 IN

## 2020-05-15 DIAGNOSIS — H04.123 DRY EYES: Primary | ICD-10-CM

## 2020-05-15 PROCEDURE — 99283 EMERGENCY DEPT VISIT LOW MDM: CPT

## 2020-05-15 NOTE — ED PROVIDER NOTES
Patient Seen in: [facility] AND [facility] Emergency Department      History   Patient presents with: Eye Visual Problem    Stated Complaint: needs eye cream    HPI    19-year-old male presents the ER for complaint of dry eyelids.   Patient is a frequent visitor Eyes:      Extraocular Movements: Extraocular movements intact. Conjunctiva/sclera: Conjunctivae normal.      Pupils: Pupils are equal, round, and reactive to light. Comments: No eye in the right orbit.    Neck:      Musculoskeletal: Normal rang

## 2020-05-15 NOTE — ED INITIAL ASSESSMENT (HPI)
Pt presents to ED via EMS requesting eye cream. Pt states he gets eye cream twice daily at [place] but wants more. Pt states he has a lot of eye irritation. No eye discharge noted at this time.

## 2020-05-16 ENCOUNTER — HOSPITAL ENCOUNTER (EMERGENCY)
Facility: HOSPITAL | Age: 30
Discharge: HOME | End: 2020-05-16
Attending: EMERGENCY MEDICINE
Payer: MEDICARE

## 2020-05-16 VITALS
TEMPERATURE: 98 F | DIASTOLIC BLOOD PRESSURE: 92 MMHG | OXYGEN SATURATION: 94 % | HEART RATE: 93 BPM | SYSTOLIC BLOOD PRESSURE: 131 MMHG | RESPIRATION RATE: 16 BRPM

## 2020-05-16 DIAGNOSIS — F32.A DEPRESSION, UNSPECIFIED DEPRESSION TYPE: Primary | ICD-10-CM

## 2020-05-16 PROBLEM — R45.851 SUICIDAL IDEATION: Status: ACTIVE | Noted: 2020-05-16

## 2020-05-16 PROCEDURE — 90792 PSYCH DIAG EVAL W/MED SRVCS: CPT | Performed by: OTHER

## 2020-05-16 RX ORDER — DIVALPROEX SODIUM 500 MG/1
500 TABLET, DELAYED RELEASE ORAL 2 TIMES DAILY
Qty: 42 TABLET | Refills: 0 | Status: SHIPPED | OUTPATIENT
Start: 2020-05-16 | End: 2020-06-06

## 2020-05-16 RX ORDER — ZIPRASIDONE HYDROCHLORIDE 40 MG/1
40 CAPSULE ORAL 2 TIMES DAILY WITH MEALS
Status: DISCONTINUED | OUTPATIENT
Start: 2020-05-16 | End: 2020-05-16

## 2020-05-16 RX ORDER — DIVALPROEX SODIUM 500 MG/1
500 TABLET, DELAYED RELEASE ORAL 2 TIMES DAILY
Status: DISCONTINUED | OUTPATIENT
Start: 2020-05-16 | End: 2020-05-16

## 2020-05-16 RX ORDER — CHLORPROMAZINE HYDROCHLORIDE 200 MG/1
200 TABLET, FILM COATED ORAL NIGHTLY
Qty: 20 TABLET | Refills: 1 | Status: SHIPPED | OUTPATIENT
Start: 2020-05-16

## 2020-05-16 RX ORDER — CHLORPROMAZINE HYDROCHLORIDE 100 MG/1
200 TABLET, FILM COATED ORAL NIGHTLY
Status: DISCONTINUED | OUTPATIENT
Start: 2020-05-16 | End: 2020-05-16

## 2020-05-16 RX ORDER — ZIPRASIDONE HYDROCHLORIDE 40 MG/1
40 CAPSULE ORAL 2 TIMES DAILY WITH MEALS
Qty: 42 CAPSULE | Refills: 0 | Status: SHIPPED | OUTPATIENT
Start: 2020-05-16 | End: 2020-06-06

## 2020-05-16 RX ORDER — ZIPRASIDONE HYDROCHLORIDE 40 MG/1
40 CAPSULE ORAL 2 TIMES DAILY WITH MEALS
Qty: 60 CAPSULE | Refills: 2 | Status: SHIPPED | OUTPATIENT
Start: 2020-05-16

## 2020-05-16 RX ORDER — NICOTINE 21 MG/24HR
1 PATCH, TRANSDERMAL 24 HOURS TRANSDERMAL DAILY
Status: DISCONTINUED | OUTPATIENT
Start: 2020-05-16 | End: 2020-05-16

## 2020-05-16 RX ORDER — ACETAMINOPHEN 325 MG/1
650 TABLET ORAL ONCE
Status: COMPLETED | OUTPATIENT
Start: 2020-05-16 | End: 2020-05-16

## 2020-05-16 RX ORDER — CHLORPROMAZINE HYDROCHLORIDE 200 MG/1
200 TABLET, FILM COATED ORAL NIGHTLY
Qty: 21 TABLET | Refills: 0 | Status: SHIPPED | OUTPATIENT
Start: 2020-05-16 | End: 2020-06-06

## 2020-05-16 NOTE — ED NOTES
Pt safe to DC with superior, Report and paperwork given to Alia Jobs with superior. Pt wheeled to exit.

## 2020-05-16 NOTE — CONSULTS
[facility] - [facility]    Report of Consultation    Yuridia Mckay Patient Status:  Emergency    10/3/1990 MRN [id number]   Location [address] Attending Ephraim Viera MD   Hosp Day # 0 PCP Jessica Mena     Date Seroquel. A change his medication to chlorpromazine and Geodon instead of Seroquel and Remeron and the patient indicated that he was sleeping well and discharged back to [facility].     The patient today reported that he failed using his coping sk UNKNOWN, HALLUCINATION  Amoxicillin             RASH    Comment:Pt states he has had itchy rash to amoxicillin in             the past  Cefepime                RASH    Comment:maculopapular rash.   Recommend against             re-challenge  Fish affect. Patient denying any auditory or visual hallucination. Cognition presented fairly appropriate for his medical condition.   Judgment and insight are questionable due to the frequent admission to the emergency room and the consistent seeking behavior Salicylate, Serum      CBC With Differential With Platelet      Urinalysis with Culture Reflex Once      SARS-CoV-2 by PCR Once      Rapid SARS-CoV-2 by PCR STAT      Meds This Visit:  Requested Prescriptions      No prescriptions requested or ordered in t

## 2020-05-16 NOTE — ED PROVIDER NOTES
Pt endorsed to me. Seen by dr Baltazar Park. Adjustments to meds made but feels patient is safe to go back to [facility]. Plan for discharge after safety plan with psych [facility].     D/w dr Ty Cleaning Rx for thorazine 200 qhs and geodon 40 bid for patient for [facility]

## 2020-05-16 NOTE — ED NOTES
Patient given additional ice water and blankets adjusted. Requesting his morning medications. Patient informed that we needed paperwork from [facility] before giving medications and that [facility] would be contacted and requested to fax paperwork.  Patient also reques

## 2020-05-16 NOTE — ED NOTES
Patient rang call light requesting morning medications and breakfast tray. Patient informed paperwork was needed from [facility] before medications could be given. Informed breakfast would be brought when it was ready.  Patient states that he is ready to go back

## 2020-05-16 NOTE — ED INITIAL ASSESSMENT (HPI)
Pt brought in from [facility]. Pt states he has been feeling more depressed for the last 2 weeks. Pt states \"they don't take good care of me there, and that makes me feel depressed, like I don't want to live anymore. \" Pt took nail clippers and atte

## 2020-05-16 NOTE — ED NOTES
Pt safe to transport back to SNF on safety plan. Report given to DIANNE Moon at [facility] and security called for belongings.

## 2020-05-16 NOTE — ED NOTES
[facility] - unable to accommodate pt   Sotero rosas (Med Psych) - no answer   Elio Ortiz (Med Psych) - left message

## 2020-05-16 NOTE — ED NOTES
Kita Strong and Ray rosas (Med Psych) - no answer  Elio Ortiz (Med Psych) - got voicemail again, did not leave second message   Lexi - requesting 24 hour COVID

## 2020-05-16 NOTE — ED NOTES
Patient requesting tylenol for pain to his arm, a fresh sheet, ice water, TV adjustment, thermostat adjustment and socks. Provided with the items listed and medicated with tylenol. Meal tray ordered.

## 2020-05-16 NOTE — ED NOTES
Patient verbalized that he is feeling more relaxed and does not want to kill himself anymore. States that he does not want to go to a facility for psych evaluation and that he wants to return to [place] my belongings are there. \"

## 2020-05-16 NOTE — ED NOTES
Spoke to [facility] at this time, requesting face sheet, history and medications. Given fax number in pod 2, verbalized understanding.

## 2020-05-16 NOTE — ED NOTES
Patient rang requesting more tylenol and warm blankets. Provided with warm blankets. When informed he just received tylenol and the doc would not approve an additional dose, patient requested ibuprofen. MD will be notified.

## 2020-05-16 NOTE — BH LEVEL OF CARE ASSESSMENT
Level of Care Assessment Note    General Questions  Why are you here?: Pt reports wanting to kill himself.    Precipitating Events: Pt states that he was having a bad night and that nobody was responding to his calls and let him sit in his diaper for most o had these thoughts and had some intention of acting on them? (past 30 days): Yes  5a. Have you started to work out or worked out the details of how to kill yourself? (past 30 days): Yes  5b. Do you intend to carry out this plan? (past 30 days): Yes  6.  Hav charting pt has phantom eye syndrome )  Delusions: No problems reported or observed  Depression Symptoms: Feelings of helplessness; Feelings of hopelessess; Feelings of worthlessness  Depression Description: Pt reports increased depression since the pandemic No  Feeding assistance needed?: Requires set-up  Patient have any chewing or swallowing problems?: No  Special Diet: Diabetic  Mobility/Activity & Assistive Devices  Current/recent injuries or surgeries that affect mobility?: Yes (Comment)(Pt was the victi symptoms  Last Withdrawal Episode: N/A  Current Withdrawal Symptoms: No    Compulsive Behaviors  Are you/others concerned about any of the following behaviors over the past 30 days?: Denies                                              Functional Impairment Poor  Fair/poor insight as evidenced by: Pt does not possess insight into his mental health and behaviors   Judgment: Poor  Fair/poor judgment as evidenced by: Pt behaves impusively, pt with SI  Thought Patterns  Clarity/Relevance: Coherent  Flow: Organize

## 2020-05-18 ENCOUNTER — HOSPITAL ENCOUNTER (EMERGENCY)
Facility: HOSPITAL | Age: 30
Discharge: HOME | End: 2020-05-18
Attending: EMERGENCY MEDICINE
Payer: MEDICARE

## 2020-05-18 VITALS
BODY MASS INDEX: 32.02 KG/M2 | HEART RATE: 111 BPM | RESPIRATION RATE: 20 BRPM | DIASTOLIC BLOOD PRESSURE: 71 MMHG | TEMPERATURE: 98 F | WEIGHT: 204 LBS | OXYGEN SATURATION: 96 % | SYSTOLIC BLOOD PRESSURE: 106 MMHG | HEIGHT: 67 IN

## 2020-05-18 VITALS
RESPIRATION RATE: 18 BRPM | SYSTOLIC BLOOD PRESSURE: 122 MMHG | TEMPERATURE: 98 F | OXYGEN SATURATION: 94 % | BODY MASS INDEX: 32 KG/M2 | DIASTOLIC BLOOD PRESSURE: 70 MMHG | HEART RATE: 113 BPM | WEIGHT: 202.81 LBS

## 2020-05-18 VITALS
RESPIRATION RATE: 20 BRPM | OXYGEN SATURATION: 98 % | WEIGHT: 202.81 LBS | SYSTOLIC BLOOD PRESSURE: 112 MMHG | HEART RATE: 115 BPM | DIASTOLIC BLOOD PRESSURE: 88 MMHG | BODY MASS INDEX: 32 KG/M2 | TEMPERATURE: 98 F

## 2020-05-18 DIAGNOSIS — M62.838 SPASM OF MUSCLE: Primary | ICD-10-CM

## 2020-05-18 DIAGNOSIS — H04.122 DRY EYE OF LEFT SIDE: Primary | ICD-10-CM

## 2020-05-18 PROCEDURE — 99283 EMERGENCY DEPT VISIT LOW MDM: CPT

## 2020-05-18 RX ORDER — IBUPROFEN 600 MG/1
600 TABLET ORAL ONCE
Status: COMPLETED | OUTPATIENT
Start: 2020-05-18 | End: 2020-05-18

## 2020-05-18 RX ORDER — DIPHENHYDRAMINE HCL 25 MG
25 CAPSULE ORAL ONCE
Status: COMPLETED | OUTPATIENT
Start: 2020-05-18 | End: 2020-05-18

## 2020-05-18 RX ORDER — ACETAMINOPHEN 500 MG
1000 TABLET ORAL ONCE
Status: COMPLETED | OUTPATIENT
Start: 2020-05-18 | End: 2020-05-18

## 2020-05-18 RX ORDER — DIAZEPAM 5 MG/1
5 TABLET ORAL EVERY 12 HOURS PRN
Qty: 30 TABLET | Refills: 0 | Status: SHIPPED | OUTPATIENT
Start: 2020-05-18

## 2020-05-18 RX ORDER — DIAZEPAM 5 MG/1
5 TABLET ORAL ONCE
Status: COMPLETED | OUTPATIENT
Start: 2020-05-18 | End: 2020-05-18

## 2020-05-18 NOTE — ED NOTES
Pt states when EMS was moving patient from cart to bed at NH, he developed neck spasm. Pt requesting pain medication. Dr. Burke Carlin at bedside.

## 2020-05-18 NOTE — ED PROVIDER NOTES
Patient Seen in: [facility] Emergency Department      History   Patient presents with:  Eye Pain    Stated Complaint: eye pain    HPI    26 yoM with history of TBI, ADHD, diabetes, right eye enucleation, presenting for evaluation of dry eyes.   Use movements intact, no purulent discharge, no foreign bodies visualized, left pupil is midrange and fixed which is chronic for this patient   Neck:      Musculoskeletal: Normal range of motion.    Cardiovascular:      Rate and Rhythm: Normal rate and regular

## 2020-05-18 NOTE — ED PROVIDER NOTES
Patient Seen in: [facility] AND [facility] Emergency Department      History   Patient presents with:  Pain    Stated Complaint: back cramping    HPI  79-year-old male very well-known to this department with diabetes, ADHD, TBI, hemiplegia, presenting for evalua 31.77 kg/m²         Physical Exam  Vitals signs and nursing note reviewed. Constitutional:       Appearance: He is well-developed. HENT:      Head: Normocephalic.       Comments: Changes consistent with TBI, right eye enucleation  Neck:      Musculoskel

## 2020-05-18 NOTE — ED NOTES
[facility] notified of patient's discharge instructions. Superior at [facility] to transport patient back to [facility].

## 2020-05-19 ENCOUNTER — HOSPITAL ENCOUNTER (EMERGENCY)
Facility: HOSPITAL | Age: 30
Discharge: HOME | End: 2020-05-19
Attending: EMERGENCY MEDICINE
Payer: MEDICARE

## 2020-05-19 VITALS
OXYGEN SATURATION: 98 % | WEIGHT: 202.81 LBS | HEIGHT: 67 IN | SYSTOLIC BLOOD PRESSURE: 131 MMHG | HEART RATE: 97 BPM | RESPIRATION RATE: 15 BRPM | DIASTOLIC BLOOD PRESSURE: 87 MMHG | TEMPERATURE: 99 F | BODY MASS INDEX: 31.83 KG/M2

## 2020-05-19 DIAGNOSIS — R53.1 WEAKNESS: Primary | ICD-10-CM

## 2020-05-19 DIAGNOSIS — F43.12 CHRONIC POST-TRAUMATIC STRESS DISORDER (PTSD): ICD-10-CM

## 2020-05-19 PROCEDURE — 99283 EMERGENCY DEPT VISIT LOW MDM: CPT

## 2020-05-19 NOTE — ED NOTES
Arrives for the 3rd time in 24 hours with vague complaints. At present offers no complaints but verbalizes was experiencing R sided weakness prior to presentation.  Patient to sleep soon after arrival.

## 2020-05-19 NOTE — ED NOTES
This writer spoke with Bridget at [employer] [facility], updated on discharge instructions and new prescription for valium. Updated that superior was called and of ETA. All questions and concerns answered.

## 2020-05-19 NOTE — ED NOTES
Patient rolled with the help of another RN and Meet FORRESTER to check back for skin break down. Patient back, sacrum and coccyx are intact.

## 2020-05-19 NOTE — ED INITIAL ASSESSMENT (HPI)
Right sided muscle spasms, seen today for same and given valium 5mg PO. Patient denies spasms at this time.

## 2020-05-19 NOTE — ED PROVIDER NOTES
Patient Seen in: [facility] AND [facility] Emergency Department    History   Patient presents with:  Musculoskeletal Problem: R leg      HPI    Patient presents to the ED complaining of diffuse spasms of his muscles, mostly in his legs.   He states that he is on patient were taken. The patient was already wearing a droplet mask on my arrival to the room.  Personal protective equipment including droplet mask, eye protection, and gloves were worn throughout the duration of the exam.  Handwashing was performed prior t the patient and/or caregiver.     Condition upon leaving the department: Stable    Disposition and Plan     Clinical Impression:  Spasm of muscle  (primary encounter diagnosis)    Disposition:  Discharge    Follow-up:  Arjun Bustamante  [address] [facility]
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
## 2020-05-19 NOTE — ED PROVIDER NOTES
Patient Seen in: [facility] AND [facility] Emergency Department      History   Patient presents with:  Musculoskeletal Problem    Stated Complaint: right side muscle twitching    HPI    44-year-old male presents the ER with complaint of muscle spasms.   Patient i normal weight. HENT:      Head: Normocephalic and atraumatic. Nose: Nose normal.   Eyes:      Extraocular Movements: Extraocular movements intact.       Conjunctiva/sclera: Conjunctivae normal.      Pupils: Pupils are equal, round, and reactive to li Impression:  Weakness  (primary encounter diagnosis)  Chronic post-traumatic stress disorder (PTSD)    Disposition:  Discharge  5/19/2020  2:21 am    Follow-up:  Marlene Moreno  [address] [address] [address] [place] [address] [phone]    In 2

## 2020-05-21 ENCOUNTER — HOSPITAL ENCOUNTER (EMERGENCY)
Facility: HOSPITAL | Age: 30
Discharge: HOME | End: 2020-05-21
Attending: EMERGENCY MEDICINE
Payer: MEDICARE

## 2020-05-21 VITALS
WEIGHT: 204 LBS | HEIGHT: 67 IN | BODY MASS INDEX: 32.02 KG/M2 | TEMPERATURE: 98 F | RESPIRATION RATE: 16 BRPM | OXYGEN SATURATION: 97 % | SYSTOLIC BLOOD PRESSURE: 110 MMHG | DIASTOLIC BLOOD PRESSURE: 85 MMHG | HEART RATE: 98 BPM

## 2020-05-21 DIAGNOSIS — M62.838 SPASM OF MUSCLE: Primary | ICD-10-CM

## 2020-05-21 PROCEDURE — 80048 BASIC METABOLIC PNL TOTAL CA: CPT | Performed by: EMERGENCY MEDICINE

## 2020-05-21 PROCEDURE — 96360 HYDRATION IV INFUSION INIT: CPT

## 2020-05-21 PROCEDURE — 85025 COMPLETE CBC W/AUTO DIFF WBC: CPT | Performed by: EMERGENCY MEDICINE

## 2020-05-21 PROCEDURE — 83735 ASSAY OF MAGNESIUM: CPT | Performed by: EMERGENCY MEDICINE

## 2020-05-21 PROCEDURE — 99284 EMERGENCY DEPT VISIT MOD MDM: CPT

## 2020-05-21 NOTE — ED INITIAL ASSESSMENT (HPI)
Pt presents to ED via EMS for muscle cramping generalized weakness. Pt speaking in full sentences. No distress noted at this time.
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
## 2020-05-21 NOTE — ED NOTES
Discharge instructions reviewed with pt. Pt denies nay further questions. Superior at pt bedside to return pt back to [place].
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
## 2020-05-22 NOTE — ED NOTES
Patient asked this RN about a black bag that he brought from [facility] Emergency Room. Informed patient that no bag was in room or on gurney with him. Medical transport [facility] arrived and searched for bag as well but no bag to be found.  Informed patient that bag may have be

## 2020-05-22 NOTE — ED INITIAL ASSESSMENT (HPI)
Patient arrived via EMS, with s/s of weakness, muscle spasm, and twitching for a few hours/PTA. A/Ox3. Describes pain as spasms not pain.    Denies injury

## 2020-05-31 NOTE — ED PROVIDER NOTES
Patient Seen in: [facility] Emergency Department      History   Patient presents with:  Spasms    Stated Complaint: muscle spasms    HPI    Pt is 25 yo M who arrives from SNF by EMS for spasms.  Pt states he feels his muscles spasming from neck nia better after ativan. Requesting Rx for discharge. Feels comfortable going home at this time.              Disposition and Plan     Clinical Impression:  Anxiety  (primary encounter diagnosis)    Disposition:  Discharge  5/31/2020  2:07 am    Follow-up:  [facility]

## 2020-05-31 NOTE — ED NOTES
Spoke with Nanci Obregon, nurse from [address] [place] [address] at [place]. Waiting on [facility] for transport.

## 2020-05-31 NOTE — ED INITIAL ASSESSMENT (HPI)
Patient c/o muscle spasms to his legs. Was given Valium at his SNF 1 hour pta and states it's not working.

## 2020-06-01 ENCOUNTER — HOSPITAL ENCOUNTER (EMERGENCY)
Facility: HOSPITAL | Age: 30
Discharge: HOME | End: 2020-06-01
Attending: EMERGENCY MEDICINE
Payer: MEDICARE

## 2020-06-01 VITALS
WEIGHT: 204 LBS | TEMPERATURE: 98 F | HEIGHT: 67 IN | RESPIRATION RATE: 20 BRPM | HEART RATE: 109 BPM | BODY MASS INDEX: 32.02 KG/M2 | OXYGEN SATURATION: 95 % | DIASTOLIC BLOOD PRESSURE: 63 MMHG | SYSTOLIC BLOOD PRESSURE: 150 MMHG

## 2020-06-01 DIAGNOSIS — M62.838 NIGHT MUSCLE SPASMS: Primary | ICD-10-CM

## 2020-06-01 PROCEDURE — 96372 THER/PROPH/DIAG INJ SC/IM: CPT

## 2020-06-01 PROCEDURE — 99283 EMERGENCY DEPT VISIT LOW MDM: CPT

## 2020-06-01 RX ORDER — LORAZEPAM 2 MG/ML
1 INJECTION INTRAMUSCULAR ONCE
Status: COMPLETED | OUTPATIENT
Start: 2020-06-01 | End: 2020-06-01

## 2020-06-01 NOTE — ED PROVIDER NOTES
Patient Seen in: [facility] AND [facility] Emergency Department      History   Patient presents with:  Spasms    Stated Complaint: muscle spasms    HPI    Pt is 25 yo M who arrives from SNF by EMS for muscle spasms.  Pt seen yesterday for same and requesting ativ recommend that you schedule follow up care with a primary care provider within the next three months to obtain basic health screening including reassessment of your blood pressure.       Medications Prescribed:  Current Discharge Medication List

## 2020-06-02 ENCOUNTER — HOSPITAL ENCOUNTER (EMERGENCY)
Facility: HOSPITAL | Age: 30
Discharge: HOME | End: 2020-06-02
Attending: EMERGENCY MEDICINE
Payer: MEDICARE

## 2020-06-02 VITALS
RESPIRATION RATE: 18 BRPM | OXYGEN SATURATION: 98 % | DIASTOLIC BLOOD PRESSURE: 90 MMHG | SYSTOLIC BLOOD PRESSURE: 122 MMHG | HEART RATE: 95 BPM | TEMPERATURE: 98 F

## 2020-06-02 DIAGNOSIS — M62.838 SPASM OF MUSCLE: Primary | ICD-10-CM

## 2020-06-02 PROCEDURE — 99283 EMERGENCY DEPT VISIT LOW MDM: CPT

## 2020-06-02 RX ORDER — ACETAMINOPHEN 500 MG
1000 TABLET ORAL ONCE
Status: DISCONTINUED | OUTPATIENT
Start: 2020-06-02 | End: 2020-06-02

## 2020-06-02 NOTE — ED NOTES
26year old male here well known to this ED, for muscle spasm in neck pt report \" I need something for spasms\", pt noted to be snoring on cart when arrived with tylenol will wait until pt awake for tylenol, on continue pulse ox

## 2020-06-02 NOTE — ED NOTES
Report to [facility]  @ [facility], endorsed pt noted to dip o2 to 89-90 on room air while sleeping placed on [facility], Dr. Archibald aware, eta superior 25 mins

## 2020-06-02 NOTE — ED PROVIDER NOTES
Patient Seen in: [facility] AND [facility] Emergency Department      History   Patient presents with: Other    Stated Complaint: muscle spasms    HPI  History is provided by EMS and patient.     79-year-old male with history of TBI, here with complaints of muscl [06/02/20 1045]   /90   Pulse 110   Resp 18   Temp 97.8 °F (36.6 °C)   Temp src Temporal   SpO2 94 %   O2 Device None (Room air)       Current:/90   Pulse 110   Temp 97.8 °F (36.6 °C) (Temporal)   Resp 18   SpO2 94%         Physical Exam  Vital to the complexity of his ED evaluation.     - pt  comfortable with d/c at this time, will d/c pt home now, pt to f/u with Dr. Sarika Sloan in 2 days or return to ED sooner if symptoms worsen including fevers, chills, vomiting, pt expresses understanding and agre

## 2020-06-02 NOTE — ED NOTES
Pt noted to be sleeping, tylenol not given, Dr. Deepa Kelly aware, discharge orders received, superior called for ride back to [facility]

## 2020-06-04 ENCOUNTER — HOSPITAL ENCOUNTER (EMERGENCY)
Facility: HOSPITAL | Age: 30
Discharge: NURSING, REHAB OR LONG-TERM CARE | End: 2020-06-04
Attending: EMERGENCY MEDICINE
Payer: MEDICARE

## 2020-06-04 VITALS
BODY MASS INDEX: 32 KG/M2 | OXYGEN SATURATION: 98 % | HEART RATE: 77 BPM | RESPIRATION RATE: 12 BRPM | SYSTOLIC BLOOD PRESSURE: 116 MMHG | WEIGHT: 203.94 LBS | TEMPERATURE: 98 F | DIASTOLIC BLOOD PRESSURE: 78 MMHG

## 2020-06-04 DIAGNOSIS — M62.838 MUSCLE SPASM: Primary | ICD-10-CM

## 2020-06-04 PROCEDURE — 99283 EMERGENCY DEPT VISIT LOW MDM: CPT
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
## 2020-06-04 NOTE — DISCHARGE SUMMARY
[facility]    Discharge Summary    Javier Mathew Patient Status:  Inpatient    10/3/1990 MRN [id number]   Location [facility] 5SW/SE Attending No att. providers found   Hosp Day # 2 PCP [facility]     Date of Admission:  Course:   Intentional overdose of eyedrops  –No symptomatic clinical abnormalities  –Patient is attention seeking  –Discussed with psychiatry  – Discharge patient back to nursing home     Major depression with suicidal ideas  –Mood is stable     Traumatic

## 2020-06-04 NOTE — ED INITIAL ASSESSMENT (HPI)
Patient states he was having hallucinations that children were killing each other, denies SI or HI. Patient was seen at [facility] today for same complaint.  Had a head CT which was normal.
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
## 2020-06-05 ENCOUNTER — HOSPITAL ENCOUNTER (EMERGENCY)
Facility: HOSPITAL | Age: 30
Discharge: HOME | End: 2020-06-06
Attending: EMERGENCY MEDICINE
Payer: MEDICARE

## 2020-06-05 DIAGNOSIS — M62.838 SPASM OF MUSCLE: Primary | ICD-10-CM

## 2020-06-05 PROCEDURE — 99283 EMERGENCY DEPT VISIT LOW MDM: CPT

## 2020-06-05 RX ORDER — DIAZEPAM 5 MG/1
5 TABLET ORAL ONCE
Status: COMPLETED | OUTPATIENT
Start: 2020-06-05 | End: 2020-06-05

## 2020-06-05 NOTE — ED PROVIDER NOTES
Patient Seen in: [facility] AND [facility] Emergency Department      History   Patient presents with:  Spasms    Stated Complaint: muscle spasm    HPI    24-year-old male presents for complaint of muscle spasms.   Patient states that he is on medication at the nu 98 °F (36.7 °C) (Temporal)   Resp 12   Wt 92.5 kg   SpO2 98%   BMI 31.94 kg/m²         Physical Exam  Vitals signs and nursing note reviewed. Constitutional:       Appearance: He is well-developed. HENT:      Head: Normocephalic and atraumatic.    Neck: recommend that you schedule follow up care with a primary care provider within the next three months to obtain basic health screening including reassessment of your blood pressure.       Medications Prescribed:  Current Discharge Medication List

## 2020-06-05 NOTE — ED INITIAL ASSESSMENT (HPI)
Patient here for muscle spasms. Patient drowsy.  Seen here multiple times this week  States \" they didn't answer my call light\"

## 2020-06-06 VITALS
HEART RATE: 97 BPM | RESPIRATION RATE: 16 BRPM | TEMPERATURE: 98 F | SYSTOLIC BLOOD PRESSURE: 113 MMHG | OXYGEN SATURATION: 94 % | DIASTOLIC BLOOD PRESSURE: 83 MMHG | WEIGHT: 203.94 LBS | BODY MASS INDEX: 32 KG/M2

## 2020-06-06 VITALS
DIASTOLIC BLOOD PRESSURE: 85 MMHG | TEMPERATURE: 99 F | RESPIRATION RATE: 18 BRPM | HEART RATE: 93 BPM | OXYGEN SATURATION: 96 % | SYSTOLIC BLOOD PRESSURE: 119 MMHG

## 2020-06-06 DIAGNOSIS — M62.838 SPASM OF MUSCLE: Primary | ICD-10-CM

## 2020-06-06 DIAGNOSIS — G89.29 OTHER CHRONIC PAIN: ICD-10-CM

## 2020-06-06 PROCEDURE — 99283 EMERGENCY DEPT VISIT LOW MDM: CPT

## 2020-06-06 RX ORDER — ACETAMINOPHEN 325 MG/1
650 TABLET ORAL ONCE
Status: COMPLETED | OUTPATIENT
Start: 2020-06-06 | End: 2020-06-06

## 2020-06-06 RX ORDER — DIAZEPAM 5 MG/1
5 TABLET ORAL ONCE
Status: COMPLETED | OUTPATIENT
Start: 2020-06-06 | End: 2020-06-06

## 2020-06-06 NOTE — ED INITIAL ASSESSMENT (HPI)
Pt states hes having muscle cramps similar to the other multiples times he has been seen for the issue.  No distress noted

## 2020-06-06 NOTE — ED NOTES
[facility] [address] [place] [address] contacted about patients return and disposition instructions. Superior at [facility] to return patient to [address] [place] [address].

## 2020-06-06 NOTE — ED PROVIDER NOTES
Patient Seen in: [facility] AND [facility] Emergency Department    History   Patient presents with: Other      HPI    Patient who is well-known to this ED returns complaining of diffuse muscle cramps.   Patient has presented for this many times in the past.  He droplet mask on my arrival to the room.  Personal protective equipment including droplet mask, eye protection, and gloves were worn throughout the duration of the exam.  Handwashing was performed prior to and after the exam.  Stethoscope and any equipment u care facility. Additional verbal instructions and return precautions were discussed with the patient and/or caregiver.       Condition upon leaving the department: Stable    Disposition and Plan     Clinical Impression:  Spasm of muscle  (primary encount
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
## 2020-06-07 NOTE — ED NOTES
Took over care of patient from [facility]. Pt resting comfortably in bed, no distress noted. Patient safe for discharge per provider. Discharge teaching done, patient verbalizes understanding.  Report called to staff at [facility] where patient is retu

## 2020-06-08 ENCOUNTER — HOSPITAL ENCOUNTER (EMERGENCY)
Facility: HOSPITAL | Age: 30
Discharge: HOME | End: 2020-06-08
Attending: EMERGENCY MEDICINE
Payer: MEDICARE

## 2020-06-08 VITALS
RESPIRATION RATE: 18 BRPM | TEMPERATURE: 99 F | SYSTOLIC BLOOD PRESSURE: 115 MMHG | HEIGHT: 67 IN | HEART RATE: 95 BPM | WEIGHT: 204 LBS | OXYGEN SATURATION: 94 % | BODY MASS INDEX: 32.02 KG/M2 | DIASTOLIC BLOOD PRESSURE: 81 MMHG

## 2020-06-08 DIAGNOSIS — G47.00 INSOMNIA, UNSPECIFIED TYPE: Primary | ICD-10-CM

## 2020-06-08 PROCEDURE — 99283 EMERGENCY DEPT VISIT LOW MDM: CPT

## 2020-06-08 RX ORDER — ACETAMINOPHEN 500 MG
TABLET ORAL
Status: COMPLETED
Start: 2020-06-08 | End: 2020-06-08

## 2020-06-08 RX ORDER — DIAZEPAM 5 MG/1
5 TABLET ORAL ONCE
Status: COMPLETED | OUTPATIENT
Start: 2020-06-08 | End: 2020-06-08

## 2020-06-08 RX ORDER — ACETAMINOPHEN 500 MG
1000 TABLET ORAL ONCE
Status: COMPLETED | OUTPATIENT
Start: 2020-06-08 | End: 2020-06-08

## 2020-06-08 NOTE — ED PROVIDER NOTES
Patient Seen in: [facility] AND [facility] Emergency Department      History   Patient presents with:  Eval-P    Stated Complaint: hallucinations    HPI    67-year-old male with hemiplegia, traumatic brain injury, well-known to emergency department presents to t deviation present. CV: s1s2+, RRR, no m/r/g, normal distal pulses  Pulmonary/Chest: CTA b/l with no rales, wheezes.  No chest wall tenderness  Abdominal: Nontender.  Nondistended. Soft. Bowel sounds are normal.   Back:   :    Musculoskeletal: Right arm

## 2020-06-08 NOTE — ED INITIAL ASSESSMENT (HPI)
Patient here from [facility] for c/o hallucinations starting last night. States that he is seeing animals and hearing voices saying \"hello\". Patient c/o L leg pain.

## 2020-06-11 ENCOUNTER — HOSPITAL ENCOUNTER (EMERGENCY)
Facility: HOSPITAL | Age: 30
Discharge: HOME | End: 2020-06-12
Attending: EMERGENCY MEDICINE
Payer: MEDICARE

## 2020-06-11 ENCOUNTER — HOSPITAL ENCOUNTER (EMERGENCY)
Facility: HOSPITAL | Age: 30
Discharge: HOME | End: 2020-06-11
Attending: EMERGENCY MEDICINE
Payer: MEDICARE

## 2020-06-11 VITALS
BODY MASS INDEX: 34.82 KG/M2 | HEART RATE: 84 BPM | DIASTOLIC BLOOD PRESSURE: 64 MMHG | WEIGHT: 203.94 LBS | RESPIRATION RATE: 16 BRPM | SYSTOLIC BLOOD PRESSURE: 99 MMHG | HEIGHT: 64 IN | TEMPERATURE: 98 F | OXYGEN SATURATION: 96 %

## 2020-06-11 DIAGNOSIS — R25.2 SPASM: Primary | ICD-10-CM

## 2020-06-11 DIAGNOSIS — M62.838 SPASM OF MUSCLE: Primary | ICD-10-CM

## 2020-06-11 PROCEDURE — 99283 EMERGENCY DEPT VISIT LOW MDM: CPT

## 2020-06-11 NOTE — CM/SW NOTE
Patient ready for discharge. BLS arranged for transport back to [address] [place] [address]. ETA 30MIN. PCS completed in epic. DIANNE Lea aware of all of the above.

## 2020-06-11 NOTE — ED PROVIDER NOTES
Patient Seen in: [facility] AND [facility] Emergency Department      History   Patient presents with:  Spasms    Stated Complaint: Muscle spasms    HPI    79-year-old male presents the ER with complaint of muscle spasms.   Patient with a past medical history of h Nose normal.   Eyes:      Extraocular Movements: Extraocular movements intact. Conjunctiva/sclera: Conjunctivae normal.      Pupils: Pupils are equal, round, and reactive to light. Neck:      Musculoskeletal: Normal range of motion and neck supple.

## 2020-06-11 NOTE — ED NOTES
Report given to Sebastian Cook RN of [facility] ambulance in ER to take pt back to Nursing Home,   Discharge instruction with ambulance crew  Pt denies any concern

## 2020-06-12 VITALS
DIASTOLIC BLOOD PRESSURE: 70 MMHG | RESPIRATION RATE: 18 BRPM | HEART RATE: 75 BPM | OXYGEN SATURATION: 98 % | TEMPERATURE: 98 F | SYSTOLIC BLOOD PRESSURE: 111 MMHG

## 2020-06-12 PROCEDURE — 82962 GLUCOSE BLOOD TEST: CPT

## 2020-06-12 NOTE — ED PROVIDER NOTES
Patient Seen in: [facility] AND [facility] Emergency Department    History   Patient presents with:  Musculoskeletal Problem    Stated Complaint: muscle spasms from neck to thighs    HPI    Patient presents to the emergency department again.   He is a frequent vi Temp 98.3 °F (36.8 °C) (Oral)   Resp 18   SpO2 98%         Physical Exam  Constitutional:  Alert, well nourished adult lying in bed in no distress. Vital signs noted. Eye:  No scleral icterus. Eyelids appear normal, no lesions.   Musculoskeletal:  Good

## 2020-06-12 NOTE — ED NOTES
Report given to Neal Wild at [facility]. Notified of pt scheduled return to their care. No questions.

## 2020-06-12 NOTE — ED INITIAL ASSESSMENT (HPI)
Patient states muscle spasm started 1 hour PTA that was 'getting worse and worse'. Pt states he received baclofen and that it didn't help. Spasms from neck to high.

## 2020-06-12 NOTE — ED NOTES
Glucose never performed on this pt. Wristband was scanned in error by this writer on another pt. RL6 was performed and issue was rectified immediately by performing Accucheck on correct pt with correct wristband.

## 2020-06-12 NOTE — ED NOTES
Blood glucose from POCT accucheck is NOT for the patient Caryn Toledo. This was entered in error with incorrect wristband. Staff involved completed RL6. Accucheck and treatment were completed for the correct patient.

## 2020-06-18 ENCOUNTER — HOSPITAL ENCOUNTER (EMERGENCY)
Facility: HOSPITAL | Age: 30
Discharge: HOME | End: 2020-06-18
Attending: EMERGENCY MEDICINE
Payer: MEDICARE

## 2020-06-18 VITALS
HEART RATE: 98 BPM | WEIGHT: 203.94 LBS | BODY MASS INDEX: 35 KG/M2 | SYSTOLIC BLOOD PRESSURE: 138 MMHG | OXYGEN SATURATION: 98 % | DIASTOLIC BLOOD PRESSURE: 74 MMHG | TEMPERATURE: 99 F | RESPIRATION RATE: 20 BRPM

## 2020-06-18 DIAGNOSIS — R44.3 HALLUCINATION: Primary | ICD-10-CM

## 2020-06-18 PROCEDURE — 99283 EMERGENCY DEPT VISIT LOW MDM: CPT

## 2020-06-18 RX ORDER — LORAZEPAM 1 MG/1
0.5 TABLET ORAL ONCE
Status: COMPLETED | OUTPATIENT
Start: 2020-06-18 | End: 2020-06-18

## 2020-06-18 NOTE — ED INITIAL ASSESSMENT (HPI)
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 The reports a long history of similar experiences. The patient denies SI/HI, or other physical or emotional complaints.

## 2020-06-18 NOTE — ED PROVIDER NOTES
Patient Seen in: [facility] AND [facility] Emergency Department      History   Patient presents with:   Other    Stated Complaint: Phantom eye syndrome    HPI    59-year-old male with history of diabetes, TBI, seizures, PTSD, major depression, brought in by EMS f Triage Vitals [06/18/20 0254]   /74   Pulse 98   Resp 20   Temp 98.8 °F (37.1 °C)   Temp src    SpO2 98 %   O2 Device None (Room air)       Current:/74   Pulse 98   Temp 98.8 °F (37.1 °C)   Resp 20   Wt 92.5 kg   SpO2 98%   BMI 35.00 kg/m² including hallucinations, SI, HI, drug seeking, [place] .           Disposition and Plan     Clinical Impression:  Hallucination  (primary encounter diagnosis)    Disposition:  Discharge  6/18/2020  2:59 am    Follow-up:  Francine Fields  [facility]

## 2020-06-19 ENCOUNTER — HOSPITAL ENCOUNTER (EMERGENCY)
Facility: HOSPITAL | Age: 30
Discharge: HOME | End: 2020-06-19
Attending: EMERGENCY MEDICINE
Payer: MEDICARE

## 2020-06-19 VITALS
SYSTOLIC BLOOD PRESSURE: 103 MMHG | DIASTOLIC BLOOD PRESSURE: 67 MMHG | RESPIRATION RATE: 18 BRPM | TEMPERATURE: 99 F | HEART RATE: 98 BPM | OXYGEN SATURATION: 96 %

## 2020-06-19 DIAGNOSIS — M62.838 SPASM OF MUSCLE: Primary | ICD-10-CM

## 2020-06-19 PROCEDURE — 99283 EMERGENCY DEPT VISIT LOW MDM: CPT

## 2020-06-19 RX ORDER — DIAZEPAM 5 MG/1
5 TABLET ORAL ONCE
Status: COMPLETED | OUTPATIENT
Start: 2020-06-19 | End: 2020-06-19

## 2020-06-19 NOTE — ED NOTES
D[facility] First again heard calling out my name repeatedly. Went to the room to speak to him. Reviewed the plan to speak to Dr Arthur Maddox after 11 a.m. this morning.   Re-explained that because he reported he was suicidal with a plan there was concern for his safet

## 2020-06-19 NOTE — PROGRESS NOTES
Responded to request by RN to offer support to the patient. Patient expressed that he was ready to go home and requested prayer. Offered support through active listening and prayer. No further spiritual care needed at this time.      06/19/20 Vince Diaz 22

## 2020-06-19 NOTE — ED PROVIDER NOTES
Patient Seen in: [facility] AND [facility] Emergency Department      History   Patient presents with:  Eval-P    Stated Complaint: SI    HPI    42-year-old male with history of TBI, major depression, diabetes, brought in by EMS for suicidal ideation.   Patient st stated complaint: SI  Other systems are as noted in HPI. Constitutional and vital signs reviewed. All other systems reviewed and negative except as noted above.     Physical Exam     ED Triage Vitals   BP 06/19/20 0209 114/81   Pulse 06/19/20 0209 103 2:51 AM   Result Value Ref Range    Ethyl Alcohol <3 <3 mg/dL   RAINBOW DRAW BLUE    Collection Time: 06/19/20  2:51 AM   Result Value Ref Range    Hold Blue Auto Resulted    RAINBOW DRAW LAVENDER    Collection Time: 06/19/20  2:51 AM   Result Value Ref Ra Negative    Cocaine Urine Negative Negative    Ecstasy Urine Negative Negative    Methadone Urine Negative Negative    Opiate Urine Negative Negative    Oxycodone Urine Negative Negative    PCP Urine Negative Negative    Creatinine Ur Random 243.00 mg/dL and interpreting test, and discussion with consultants.             Disposition and Plan     Clinical Impression:  Suicidal ideation  (primary encounter diagnosis)    Disposition:  Psychiatric transfer  6/19/2020  3:00 am    Follow-up:  Tawana Otto

## 2020-06-19 NOTE — ED NOTES
Teleconference was completed with Dr Reynold Bell. Discharge back to the Nursing Home is recommended. Dr Reynold Bell is increasing Chlorpromazine to 300 m.g. to assist with sleep as Meghan Arango reported difficulty sleeping.   In addition, the Kepra dose is being lowe

## 2020-06-19 NOTE — ED NOTES
Advised by Calvin nova that Tiffanie Crowder was requesting to speak to me and wanted me to talk to his brother. Met with Tiffanie Crowder. He requested that I call his brother William Fletcher to discuss 'what is going on'.   Tiffanie Crowder provided the phone number of ([phone]

## 2020-06-19 NOTE — ED NOTES
[facility] arrived to transport patient back to [facility]. Left with all patient belongings and paperwork.
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
## 2020-06-19 NOTE — ED NOTES
Anish repeatedly yelling out for nurse with multiple requests. Instructed patient not to yell as ER-T is at bedside for direct observation. Awaiting further assessment by psych.

## 2020-06-19 NOTE — ED NOTES
Alexandr Minium - notified of pt   Wilmer Jane - no answer  [address] and Lexis Cartagena - requires [address]

## 2020-06-19 NOTE — ED NOTES
Spoke to Torey Bernstein. Discussed Dr Reynold Bell being available by computer/teleconference to talk to him regarding precipitating events, suicidal statements and the tele-conference consent. Torey Bernstein is legally blind.    Torey Bernstein provided verbal consent for the t

## 2020-06-19 NOTE — ED NOTES
Report given to patient's RN at [facility]. [facility] called for transport back to [facility].

## 2020-06-19 NOTE — ED NOTES
Writer heard Jacqui Benedict calling out my name, (repeatedly), through the closed door. He inquired whether I had spoken to his brother and what was going to happen.   Updated Jacqui Benedict regarding my conversation with [place] and reiterated that Dr Glendy Clinton will do t

## 2020-06-19 NOTE — ED NOTES
Pt attempted to recant suicidal statements. Pt adamant he did not want to go to a hospital in [facility]. And that if that is where we were going to send him he didn't want go.  Pt then tried to state that he was going to [facility] and have good days\" from now on
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
## 2020-06-20 NOTE — CM/SW NOTE
CHRISTIAN arranged back to [facility]. ETA 90MIN. PCS completed. DIANNE Osman aware of all of the above.

## 2020-06-20 NOTE — ED INITIAL ASSESSMENT (HPI)
Pt to er from nh with c/o muscle spasms from his neck to his right thigh that has subsided since he got here.

## 2020-06-20 NOTE — ED PROVIDER NOTES
Patient Seen in: [facility] AND [facility] Emergency Department    History   Patient presents with:  Spasms      HPI    Patient who is well-known to this facility presents for diffuse muscle spasms. Moderate in severity. Now subsided. History reviewed.    Pa droplet mask, eye protection, and gloves were worn throughout the duration of the exam.  Handwashing was performed prior to and after the exam.  Stethoscope and any equipment used during my examination was cleaned with super sani-cloth germicidal wipes fol in 3 days        Medications Prescribed:  Discharge Medication List as of 6/19/2020 11:18 PM

## 2020-06-26 ENCOUNTER — HOSPITAL ENCOUNTER (EMERGENCY)
Facility: HOSPITAL | Age: 30
Discharge: HOME | End: 2020-06-26
Attending: EMERGENCY MEDICINE
Payer: MEDICARE

## 2020-06-26 VITALS
HEART RATE: 107 BPM | TEMPERATURE: 98 F | DIASTOLIC BLOOD PRESSURE: 84 MMHG | SYSTOLIC BLOOD PRESSURE: 115 MMHG | OXYGEN SATURATION: 96 % | RESPIRATION RATE: 16 BRPM

## 2020-06-26 DIAGNOSIS — R53.1 WEAKNESS GENERALIZED: Primary | ICD-10-CM

## 2020-06-26 PROCEDURE — 99283 EMERGENCY DEPT VISIT LOW MDM: CPT

## 2020-06-26 PROCEDURE — 82962 GLUCOSE BLOOD TEST: CPT

## 2020-06-26 NOTE — ED NOTES
Pt dcd back to nursing home per [facility], discharge instruction given to pt and voices understanding, denies any concern
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
## 2020-06-26 NOTE — ED NOTES
Patient refusing UA. Patient told that the urine would show us if he had an infection which could cause his weakness. Patient denying any urinary symptoms. [facility].

## 2020-06-30 ENCOUNTER — HOSPITAL ENCOUNTER (EMERGENCY)
Facility: HOSPITAL | Age: 30
Discharge: HOME | End: 2020-06-30
Attending: EMERGENCY MEDICINE
Payer: MEDICARE

## 2020-06-30 VITALS
SYSTOLIC BLOOD PRESSURE: 118 MMHG | RESPIRATION RATE: 18 BRPM | HEART RATE: 104 BPM | DIASTOLIC BLOOD PRESSURE: 90 MMHG | TEMPERATURE: 98 F | OXYGEN SATURATION: 94 %

## 2020-06-30 DIAGNOSIS — M62.838 SPASM OF MUSCLE: Primary | ICD-10-CM

## 2020-06-30 PROCEDURE — 99283 EMERGENCY DEPT VISIT LOW MDM: CPT

## 2020-06-30 NOTE — ED NOTES
Report given to 1527 michaela Hebert dcd back to nursing home per [facility]  Discharge instruction given to pt and voices understading

## 2020-06-30 NOTE — CM/SW NOTE
Discharge BLS arranged by [address] [place] [address]. PCS completed in epic. DIANNE Lea aware.

## 2020-07-02 ENCOUNTER — HOSPITAL ENCOUNTER (EMERGENCY)
Facility: HOSPITAL | Age: 30
Discharge: HOME | End: 2020-07-02
Attending: EMERGENCY MEDICINE
Payer: MEDICARE

## 2020-07-02 VITALS
SYSTOLIC BLOOD PRESSURE: 116 MMHG | BODY MASS INDEX: 39.99 KG/M2 | DIASTOLIC BLOOD PRESSURE: 72 MMHG | WEIGHT: 240 LBS | RESPIRATION RATE: 18 BRPM | HEIGHT: 65 IN | TEMPERATURE: 98 F | OXYGEN SATURATION: 94 % | HEART RATE: 105 BPM

## 2020-07-02 DIAGNOSIS — M62.838 SPASM OF MUSCLE: Primary | ICD-10-CM

## 2020-07-02 PROCEDURE — 99283 EMERGENCY DEPT VISIT LOW MDM: CPT

## 2020-07-02 NOTE — ED PROVIDER NOTES
Patient Seen in: [facility] AND [facility] Emergency Department    History   Patient presents with:  Spasms      HPI     [facility] [address] complaining of muscle spasms from his left shoulder to his left leg.   He states these are no O2 Device None (Room air)       All measures to prevent infection transmission during my interaction with the patient were taken. The patient was already wearing a droplet mask on my arrival to the room.  Personal protective equipment including droplet ma to his care facility. Additional verbal instructions and return precautions were discussed with the patient and/or caregiver.     Condition upon leaving the department: Stable    Disposition and Plan     Clinical Impression:  Spasm of muscle  (primary en

## 2020-07-04 ENCOUNTER — HOSPITAL ENCOUNTER (EMERGENCY)
Facility: HOSPITAL | Age: 30
Discharge: HOME | End: 2020-07-04
Attending: EMERGENCY MEDICINE
Payer: MEDICARE

## 2020-07-04 VITALS
HEIGHT: 67 IN | TEMPERATURE: 98 F | WEIGHT: 192 LBS | OXYGEN SATURATION: 93 % | HEART RATE: 98 BPM | RESPIRATION RATE: 18 BRPM | SYSTOLIC BLOOD PRESSURE: 106 MMHG | DIASTOLIC BLOOD PRESSURE: 74 MMHG | BODY MASS INDEX: 30.13 KG/M2

## 2020-07-04 VITALS
SYSTOLIC BLOOD PRESSURE: 108 MMHG | HEIGHT: 68 IN | RESPIRATION RATE: 20 BRPM | HEART RATE: 88 BPM | OXYGEN SATURATION: 95 % | DIASTOLIC BLOOD PRESSURE: 81 MMHG | TEMPERATURE: 99 F | BODY MASS INDEX: 28.79 KG/M2 | WEIGHT: 190 LBS

## 2020-07-04 DIAGNOSIS — F32.A DEPRESSION, UNSPECIFIED DEPRESSION TYPE: Primary | ICD-10-CM

## 2020-07-04 DIAGNOSIS — M62.838 SPASM OF MUSCLE: Primary | ICD-10-CM

## 2020-07-04 LAB — GLUCOSE BLDC GLUCOMTR-MCNC: 87 MG/DL (ref 70–99)

## 2020-07-04 PROCEDURE — 82962 GLUCOSE BLOOD TEST: CPT

## 2020-07-04 PROCEDURE — 99285 EMERGENCY DEPT VISIT HI MDM: CPT

## 2020-07-04 PROCEDURE — 99283 EMERGENCY DEPT VISIT LOW MDM: CPT

## 2020-07-04 RX ORDER — ACETAMINOPHEN 500 MG
1000 TABLET ORAL ONCE
Status: COMPLETED | OUTPATIENT
Start: 2020-07-04 | End: 2020-07-04

## 2020-07-04 RX ORDER — LORAZEPAM 1 MG/1
1 TABLET ORAL ONCE
Status: COMPLETED | OUTPATIENT
Start: 2020-07-04 | End: 2020-07-04

## 2020-07-04 RX ORDER — DIAZEPAM 5 MG/1
5 TABLET ORAL ONCE
Status: COMPLETED | OUTPATIENT
Start: 2020-07-04 | End: 2020-07-04

## 2020-07-04 RX ORDER — MAGNESIUM OXIDE 400 MG (241.3 MG MAGNESIUM) TABLET
3 TABLET ONCE
Status: COMPLETED | OUTPATIENT
Start: 2020-07-04 | End: 2020-07-04

## 2020-07-04 RX ORDER — BACLOFEN 10 MG/1
10 TABLET ORAL ONCE
Status: COMPLETED | OUTPATIENT
Start: 2020-07-04 | End: 2020-07-04

## 2020-07-04 RX ORDER — CHLORPROMAZINE HYDROCHLORIDE 100 MG/1
200 TABLET, FILM COATED ORAL ONCE
Status: COMPLETED | OUTPATIENT
Start: 2020-07-04 | End: 2020-07-04

## 2020-07-04 RX ORDER — ACETAMINOPHEN 500 MG
TABLET ORAL
Status: COMPLETED
Start: 2020-07-04 | End: 2020-07-04

## 2020-07-04 RX ORDER — DIPHENHYDRAMINE HCL 25 MG
50 CAPSULE ORAL ONCE
Status: COMPLETED | OUTPATIENT
Start: 2020-07-04 | End: 2020-07-04

## 2020-07-04 NOTE — ED INITIAL ASSESSMENT (HPI)
Pt came back to the ED for Chronic SI. Reports his plan is to cut hit wrists. RR even and nonlabored, speaking in full sentences.
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
## 2020-07-04 NOTE — ED NOTES
Pt's diaper was changed and excessive linen was removed. Pt's vape was discovered. Pt asked if he could keep it, I informed him no. Security was called and Pt's nicotine vape was given to them. Pt also has lunch and is eating with set up food assistance.
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
## 2020-07-04 NOTE — ED INITIAL ASSESSMENT (HPI)
EMS states that the Pt  Is living at a NH and is making suicidal statements. Pt states I don't want to live anymore\".

## 2020-07-04 NOTE — ED NOTES
The patient is cleared for discharge per Emergency Department physician. Discharge instructions were reviewed with patient including when and how to follow up with healthcare providers and when to seek emergency care.  Medication use was reviewed and presc 164.46

## 2020-07-04 NOTE — ED PROVIDER NOTES
Patient Seen in: [facility] Emergency Department    History   Patient presents with:  Psychiatric Problem      HPI    Patient presents to the ED from his nursing home after making suicidal statements.   On ED arrival he states that he actually is \ 98 %   O2 Device        All measures to prevent infection transmission during my interaction with the patient were taken. The patient was already wearing a droplet mask on my arrival to the room.  Personal protective equipment including droplet mask, eye pr vitals.     Pulse Ox: 95%, Room air, Normal     Monitor Interpretation:   normal sinus rhythm    Differential Diagnosis/ Diagnostic Considerations: Muscle spasms, depression    Medical Record Review: I personally reviewed available prior medical records for

## 2020-08-17 ENCOUNTER — HOSPITAL ENCOUNTER (EMERGENCY)
Facility: HOSPITAL | Age: 30
Discharge: HOME | End: 2020-08-17
Attending: EMERGENCY MEDICINE
Payer: MEDICARE

## 2020-08-17 VITALS
TEMPERATURE: 98 F | DIASTOLIC BLOOD PRESSURE: 84 MMHG | BODY MASS INDEX: 30 KG/M2 | SYSTOLIC BLOOD PRESSURE: 115 MMHG | OXYGEN SATURATION: 96 % | WEIGHT: 192 LBS | HEART RATE: 97 BPM | RESPIRATION RATE: 14 BRPM

## 2020-08-17 DIAGNOSIS — M62.838 SPASM OF MUSCLE: Primary | ICD-10-CM

## 2020-08-17 PROCEDURE — 99283 EMERGENCY DEPT VISIT LOW MDM: CPT

## 2020-08-17 PROCEDURE — 96372 THER/PROPH/DIAG INJ SC/IM: CPT

## 2020-08-17 RX ORDER — ACETAMINOPHEN 500 MG
1000 TABLET ORAL ONCE
Status: COMPLETED | OUTPATIENT
Start: 2020-08-17 | End: 2020-08-17

## 2020-08-17 RX ORDER — LORAZEPAM 2 MG/ML
1 INJECTION INTRAMUSCULAR ONCE
Status: COMPLETED | OUTPATIENT
Start: 2020-08-17 | End: 2020-08-17

## 2020-08-17 NOTE — ED PROVIDER NOTES
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   Mouth/Throat: MMM, post OP

## 2020-08-17 NOTE — ED INITIAL ASSESSMENT (HPI)
Pt c/o muscle spasms to entire rt side of body. Pt states that he called 911 because he wanted tylenol for pain, but no one was responding to his call light.

## 2020-08-19 NOTE — ED PROVIDER NOTES
Patient Seen in: [facility] AND [facility] Emergency Department      History   Patient presents with:  Eval-P    Stated Complaint:     HPI    25-year-old male well-known to this department with history of TBI, ADHD, diabetes, presenting for evaluation of suicida effort is normal.      Breath sounds: Normal breath sounds. Abdominal:      General: There is no distension. Palpations: Abdomen is soft. Tenderness: There is no tenderness. Musculoskeletal: Normal range of motion.    Skin:     General: Skin i depression type  (primary encounter diagnosis)    Disposition:  There is no disposition on file for this visit. There is no disposition time on file for this visit. Follow-up:  No follow-up provider specified.   We recommend that you schedule follow up Price (Do Not Change): 0.00 Detail Level: Simple Instructions: This plan will send the code FBSD to the PM system.  DO NOT or CHANGE the price.

## 2020-08-22 ENCOUNTER — HOSPITAL ENCOUNTER (EMERGENCY)
Facility: HOSPITAL | Age: 30
Discharge: HOME | End: 2020-08-22
Attending: EMERGENCY MEDICINE
Payer: MEDICARE

## 2020-08-22 VITALS
SYSTOLIC BLOOD PRESSURE: 118 MMHG | OXYGEN SATURATION: 95 % | BODY MASS INDEX: 28.25 KG/M2 | WEIGHT: 180 LBS | RESPIRATION RATE: 20 BRPM | HEART RATE: 103 BPM | DIASTOLIC BLOOD PRESSURE: 82 MMHG | HEIGHT: 67 IN | TEMPERATURE: 98 F

## 2020-08-22 DIAGNOSIS — R44.3 HALLUCINATIONS: ICD-10-CM

## 2020-08-22 DIAGNOSIS — G47.00 INSOMNIA, UNSPECIFIED TYPE: Primary | ICD-10-CM

## 2020-08-22 PROCEDURE — 99283 EMERGENCY DEPT VISIT LOW MDM: CPT

## 2020-08-22 RX ORDER — HYDROXYZINE HYDROCHLORIDE 25 MG/1
25 TABLET, FILM COATED ORAL ONCE
Status: COMPLETED | OUTPATIENT
Start: 2020-08-22 | End: 2020-08-22

## 2020-08-22 NOTE — ED INITIAL ASSESSMENT (HPI)
Patient here from [facility] via [facility] for c/o hallucinations. States that he is seeing animals for the past 2 hours. Denies SI/HI, calm and cooperative at this time.
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
## 2020-08-22 NOTE — ED NOTES
Pt presents to ED for animal hallucinations. Pt denies SI/HI. Pt requesting something for sleep. Dr. Valeriy michelle.

## 2020-08-22 NOTE — ED NOTES
[facility] at bedside. Discharge instructions reviewed with pt. Pt denies any further questions at this time.

## 2020-08-23 NOTE — ED NOTES
Attempted to call for the report to [facility]. Nurses are in the report. Asked to call later.
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
## 2020-08-23 NOTE — ED NOTES
Patient continuously yelling out at staff. Requesting multiple things including ativan, tylenol and artificial tears. MD informed.

## 2020-08-23 NOTE — ED NOTES
Received voice mail message from [facility] with [address] her back. [facility] advised that they have a bed for [place]. Phone number to call nurse report is [phone].

## 2020-08-23 NOTE — ED NOTES
Received voice mail from [facility]. Jesús Mckeon left a message that they have no bed available and do not anticipate any beds opening up today.

## 2020-08-23 NOTE — ED NOTES
Spoke further with Minoo Rosa at [address] has been accepted for admission to [facility] under the care of Dr Brynn Al. He will be admitted to room 374(1). Okay to send the patient, per Minoo Rosa.

## 2020-08-23 NOTE — ED NOTES
Lexi - require results of longer COVID as pt is from a nursing home   [address] [place] [address] - no answer  [employer] - faxed packet

## 2020-08-23 NOTE — ED NOTES
Spoke with pt about his level of care recommendation. Pt agreeable to signing in. Went over rights with the pt.

## 2020-08-31 ENCOUNTER — HOSPITAL ENCOUNTER (EMERGENCY)
Facility: HOSPITAL | Age: 30
Discharge: HOME | End: 2020-08-31
Attending: EMERGENCY MEDICINE
Payer: MEDICARE

## 2020-08-31 VITALS
SYSTOLIC BLOOD PRESSURE: 113 MMHG | BODY MASS INDEX: 28.25 KG/M2 | TEMPERATURE: 99 F | RESPIRATION RATE: 18 BRPM | WEIGHT: 180 LBS | OXYGEN SATURATION: 96 % | HEIGHT: 67 IN | HEART RATE: 92 BPM | DIASTOLIC BLOOD PRESSURE: 82 MMHG

## 2020-08-31 DIAGNOSIS — M62.838 SPASM OF MUSCLE: Primary | ICD-10-CM

## 2020-08-31 PROCEDURE — 99283 EMERGENCY DEPT VISIT LOW MDM: CPT

## 2020-08-31 PROCEDURE — 99282 EMERGENCY DEPT VISIT SF MDM: CPT

## 2020-08-31 NOTE — ED PROVIDER NOTES
Patient Seen in: [facility] AND [facility] Emergency Department    History   Patient presents with:  Spasms      HPI    Patient presents to the ED complaining of generalized muscle spasms.   Long history of the same and currently prescribed Valium at his care fac noted.    Physical Exam     ED Triage Vitals [08/31/20 0229]   /89   Pulse 91   Resp 20   Temp 98.9 °F (37.2 °C)   Temp src Oral   SpO2 96 %   O2 Device None (Room air)       All measures to prevent infection transmission during my interaction with ramone Factors: The patient already has does not have any pertinent problems on file. to contribute to the complexity of this ED evaluation. ED Course: Patient presents for acute on chronic muscle spasms. Now feeling better. No need for current medication.

## 2020-09-17 ENCOUNTER — HOSPITAL ENCOUNTER (EMERGENCY)
Facility: HOSPITAL | Age: 30
Discharge: HOME | End: 2020-09-17
Attending: EMERGENCY MEDICINE
Payer: MEDICARE

## 2020-09-17 VITALS
SYSTOLIC BLOOD PRESSURE: 118 MMHG | DIASTOLIC BLOOD PRESSURE: 87 MMHG | WEIGHT: 179.88 LBS | RESPIRATION RATE: 20 BRPM | BODY MASS INDEX: 28 KG/M2 | HEART RATE: 95 BPM | TEMPERATURE: 98 F | OXYGEN SATURATION: 98 %

## 2020-09-17 DIAGNOSIS — M62.838 SPASM OF MUSCLE: Primary | ICD-10-CM

## 2020-09-17 PROCEDURE — 96372 THER/PROPH/DIAG INJ SC/IM: CPT

## 2020-09-17 PROCEDURE — 99283 EMERGENCY DEPT VISIT LOW MDM: CPT

## 2020-09-17 RX ORDER — LORAZEPAM 2 MG/ML
2 INJECTION INTRAMUSCULAR ONCE
Status: COMPLETED | OUTPATIENT
Start: 2020-09-17 | End: 2020-09-17

## 2020-09-17 RX ORDER — ACETAMINOPHEN 500 MG
1000 TABLET ORAL ONCE
Status: COMPLETED | OUTPATIENT
Start: 2020-09-17 | End: 2020-09-17

## 2020-09-17 NOTE — ED PROVIDER NOTES
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   Mouth/Throat: MMM, post OP clear with no exudates  Eyes: Blind i

## 2020-09-23 ENCOUNTER — HOSPITAL ENCOUNTER (EMERGENCY)
Facility: HOSPITAL | Age: 30
Discharge: HOME | End: 2020-09-23
Attending: EMERGENCY MEDICINE
Payer: MEDICARE

## 2020-09-23 VITALS
OXYGEN SATURATION: 96 % | SYSTOLIC BLOOD PRESSURE: 113 MMHG | HEART RATE: 93 BPM | RESPIRATION RATE: 18 BRPM | TEMPERATURE: 98 F | DIASTOLIC BLOOD PRESSURE: 73 MMHG

## 2020-09-23 DIAGNOSIS — M62.838 SPASM OF MUSCLE: Primary | ICD-10-CM

## 2020-09-23 PROCEDURE — 99283 EMERGENCY DEPT VISIT LOW MDM: CPT

## 2020-09-23 RX ORDER — CYCLOBENZAPRINE HCL 10 MG
10 TABLET ORAL ONCE
Status: COMPLETED | OUTPATIENT
Start: 2020-09-23 | End: 2020-09-23

## 2020-09-23 NOTE — ED PROVIDER NOTES
Patient Seen in: [facility] AND [facility] Emergency Department      History   Patient presents with:  Pain    Stated Complaint: pain, muscle spasms    HPI    11-year-old male with history of TBI, blindness, diabetes, here with complaints of left-sided muscle sp other systems reviewed and are negative. Positive for stated complaint: pain, muscle spasms  Other systems are as noted in HPI. Constitutional and vital signs reviewed. All other systems reviewed and negative except as noted above.     Physical E already has does not have any pertinent problems on file. to contribute to the complexity of his ED evaluation.     - pt  comfortable with d/c at this time, will d/c pt home now , pt to f/u with Dr. Rosaura Graf in 2 days or return to ED sooner if symptoms worse

## 2020-09-24 ENCOUNTER — HOSPITAL ENCOUNTER (EMERGENCY)
Facility: HOSPITAL | Age: 30
Discharge: HOME | End: 2020-09-24
Attending: EMERGENCY MEDICINE
Payer: MEDICARE

## 2020-09-24 VITALS
HEART RATE: 98 BPM | RESPIRATION RATE: 18 BRPM | SYSTOLIC BLOOD PRESSURE: 101 MMHG | DIASTOLIC BLOOD PRESSURE: 72 MMHG | TEMPERATURE: 98 F | OXYGEN SATURATION: 97 %

## 2020-09-24 DIAGNOSIS — H04.129 DRY EYE: Primary | ICD-10-CM

## 2020-09-24 PROCEDURE — 99283 EMERGENCY DEPT VISIT LOW MDM: CPT

## 2020-09-24 NOTE — ED NOTES
Discharge instruction given to [facility] and voices understanding, denies any concern, pt being transported back to [facility] at this time per [facility]

## 2020-09-24 NOTE — ED NOTES
Received call from [address] [place] [address] from [facility], report given, a discharge instruction faxed to fax number  as per request by Swetha Frias

## 2020-09-24 NOTE — ED NOTES
Attempted to contact [facility] at phone number  several times to give report to a nurse that take care of Caleb Primus and nobody answering the phone

## 2020-09-24 NOTE — ED PROVIDER NOTES
Patient Seen in: [facility] AND [facility] Emergency Department      History   Patient presents with: Eye Visual Problem    Stated Complaint:     HPI    79-year-old male with history of dry eye sensation for 1 day.   Patient states that he is normally scheduled negative. Positive for stated complaint:   Other systems are as noted in HPI. Constitutional and vital signs reviewed. All other systems reviewed and negative except as noted above.     Physical Exam     ED Triage Vitals [09/24/20 0338]   / has does not have any pertinent problems on file. to contribute to the complexity of his ED evaluation.     - pt  comfortable with d/c at this time, will d/c pt home now , pt to f/u with Dr. Katrin Vizcaino in 2 days or return to ED sooner if symptoms worsen includ

## 2020-09-25 ENCOUNTER — HOSPITAL ENCOUNTER (EMERGENCY)
Facility: HOSPITAL | Age: 30
Discharge: HOME | End: 2020-09-25
Attending: EMERGENCY MEDICINE
Payer: MEDICARE

## 2020-09-25 VITALS
OXYGEN SATURATION: 98 % | RESPIRATION RATE: 18 BRPM | BODY MASS INDEX: 26.68 KG/M2 | HEART RATE: 107 BPM | HEIGHT: 67 IN | WEIGHT: 170 LBS | TEMPERATURE: 98 F | DIASTOLIC BLOOD PRESSURE: 83 MMHG | SYSTOLIC BLOOD PRESSURE: 110 MMHG

## 2020-09-25 DIAGNOSIS — M62.838 SPASM OF MUSCLE: Primary | ICD-10-CM

## 2020-09-25 PROCEDURE — 99283 EMERGENCY DEPT VISIT LOW MDM: CPT

## 2020-09-25 RX ORDER — ACETAMINOPHEN 500 MG
1000 TABLET ORAL ONCE
Status: COMPLETED | OUTPATIENT
Start: 2020-09-25 | End: 2020-09-25

## 2020-09-25 RX ORDER — DIAZEPAM 2 MG/1
2 TABLET ORAL ONCE
Status: COMPLETED | OUTPATIENT
Start: 2020-09-25 | End: 2020-09-25

## 2020-09-25 NOTE — ED PROVIDER NOTES
Patient Seen in: [facility] AND [facility] Emergency Department      History   Patient presents with:  Spasms    Stated Complaint: MUSCLE SPASMS    HPI    27-year-old male with history of TBI, major depressive disorder, here with complaints of left-sided muscle Neurological: Negative for dizziness. Psychiatric/Behavioral: Negative for suicidal ideas. All other systems reviewed and are negative. Positive for stated complaint: MUSCLE SPASMS  Other systems are as noted in HPI.   Constitutional and vital si procedures, and reviewed those reports. Complicating Factors: The patient already has does not have any pertinent problems on file. to contribute to the complexity of his ED evaluation.     - pt  comfortable with d/c at this time, will d/c pt home now ,

## 2020-09-25 NOTE — ED INITIAL ASSESSMENT (HPI)
Left-side of body acute-on-chronic pain. A&o to baseline. Pt denies any other complaints/concerns at this time.

## 2020-09-27 ENCOUNTER — HOSPITAL ENCOUNTER (EMERGENCY)
Facility: HOSPITAL | Age: 30
Discharge: HOME | End: 2020-09-27
Attending: EMERGENCY MEDICINE
Payer: MEDICARE

## 2020-09-27 VITALS
DIASTOLIC BLOOD PRESSURE: 78 MMHG | SYSTOLIC BLOOD PRESSURE: 111 MMHG | HEIGHT: 67 IN | RESPIRATION RATE: 20 BRPM | TEMPERATURE: 98 F | WEIGHT: 170 LBS | HEART RATE: 104 BPM | BODY MASS INDEX: 26.68 KG/M2 | OXYGEN SATURATION: 99 %

## 2020-09-27 DIAGNOSIS — M62.830 BACK SPASM: Primary | ICD-10-CM

## 2020-09-27 PROCEDURE — 99283 EMERGENCY DEPT VISIT LOW MDM: CPT

## 2020-09-27 RX ORDER — CYCLOBENZAPRINE HCL 10 MG
10 TABLET ORAL ONCE
Status: COMPLETED | OUTPATIENT
Start: 2020-09-27 | End: 2020-09-27

## 2020-09-27 NOTE — ED PROVIDER NOTES
Patient Seen in: [facility] AND [facility] Emergency Department      History   Patient presents with:  Spasms    Stated Complaint:     HPI    59-year-old male with history of TBI, diabetes, well-known to the emergency department, here with complaints of back spa as noted in HPI. Constitutional and vital signs reviewed. All other systems reviewed and negative except as noted above.     Physical Exam     ED Triage Vitals   BP 09/27/20 0343 118/80   Pulse 09/27/20 0343 111   Resp 09/27/20 0343 20   Temp 09/27/20 reviewed those reports. Complicating Factors: The patient already has does not have any pertinent problems on file. to contribute to the complexity of his ED evaluation.     - pt  comfortable with d/c at this time, will d/c pt home now , pt to f/u with D

## 2020-09-30 ENCOUNTER — HOSPITAL ENCOUNTER (EMERGENCY)
Facility: HOSPITAL | Age: 30
Discharge: HOME | End: 2020-09-30
Attending: EMERGENCY MEDICINE
Payer: MEDICARE

## 2020-09-30 VITALS
DIASTOLIC BLOOD PRESSURE: 77 MMHG | OXYGEN SATURATION: 96 % | HEART RATE: 108 BPM | TEMPERATURE: 98 F | RESPIRATION RATE: 20 BRPM | SYSTOLIC BLOOD PRESSURE: 129 MMHG

## 2020-09-30 DIAGNOSIS — M62.838 SPASM OF MUSCLE: Primary | ICD-10-CM

## 2020-09-30 PROCEDURE — 99283 EMERGENCY DEPT VISIT LOW MDM: CPT

## 2020-09-30 PROCEDURE — 96372 THER/PROPH/DIAG INJ SC/IM: CPT

## 2020-09-30 RX ORDER — LORAZEPAM 2 MG/ML
2 INJECTION INTRAMUSCULAR ONCE
Status: COMPLETED | OUTPATIENT
Start: 2020-09-30 | End: 2020-09-30

## 2020-09-30 RX ORDER — ACETAMINOPHEN 500 MG
1000 TABLET ORAL ONCE
Status: COMPLETED | OUTPATIENT
Start: 2020-09-30 | End: 2020-09-30

## 2020-09-30 NOTE — ED NOTES
Patient is well-known to this ER, patient is at baseline. Patient is complaining of muscle spasms. Patient is alert and oriented in no obvious distress.

## 2020-09-30 NOTE — ED NOTES
[facility]. Aware of patient's impending return.

## 2020-10-01 ENCOUNTER — HOSPITAL ENCOUNTER (EMERGENCY)
Facility: HOSPITAL | Age: 30
Discharge: HOME | End: 2020-10-01
Attending: EMERGENCY MEDICINE
Payer: MEDICARE

## 2020-10-01 VITALS
SYSTOLIC BLOOD PRESSURE: 126 MMHG | HEART RATE: 106 BPM | OXYGEN SATURATION: 93 % | DIASTOLIC BLOOD PRESSURE: 92 MMHG | TEMPERATURE: 98 F | RESPIRATION RATE: 18 BRPM

## 2020-10-01 DIAGNOSIS — M62.838 SPASM OF MUSCLE: Primary | ICD-10-CM

## 2020-10-01 PROCEDURE — 99283 EMERGENCY DEPT VISIT LOW MDM: CPT

## 2020-10-01 NOTE — ED PROVIDER NOTES
Patient Seen in: [facility] AND [facility] Emergency Department      History   Patient presents with:  Spasms    Stated Complaint: muscle spasm    HPI    25-year-old male very well-known to this department with history of TBI, diabetes, blindness, presents for e Appearance: He is well-developed. HENT:      Head: Normocephalic. Comments: Multiple calvarial deformities from TBI  Neck:      Musculoskeletal: Normal range of motion. Cardiovascular:      Rate and Rhythm: Normal rate and regular rhythm.       Hea

## 2020-10-01 NOTE — ED INITIAL ASSESSMENT (HPI)
Pt arrive in ER per [facility] ambulance with c/o muscle spasm started last night, pt received valium last night at the nursing home

## 2020-10-01 NOTE — ED NOTES
Pt dcd back to nursing [facility], discharge instruction given and voices understanding, denies any concern

## 2021-03-04 ENCOUNTER — EXTERNAL RECORD (OUTPATIENT)
Dept: HEALTH INFORMATION MANAGEMENT | Facility: OTHER | Age: 31
End: 2021-03-04

## 2021-03-04 ENCOUNTER — LAB REQUISITION (OUTPATIENT)
Dept: LAB | Age: 31
End: 2021-03-04
Attending: FAMILY MEDICINE

## 2021-03-04 DIAGNOSIS — G40.89 OTHER SEIZURES (CMD): ICD-10-CM

## 2021-03-04 DIAGNOSIS — E11.9 TYPE 2 DIABETES MELLITUS WITHOUT COMPLICATIONS (CMD): ICD-10-CM

## 2021-03-04 DIAGNOSIS — S06.2X9D DIFFUSE TRAUMATIC BRAIN INJURY WITH LOSS OF CONSCIOUSNESS OF UNSPECIFIED DURATION, SUBSEQUENT ENCOUNTER: ICD-10-CM

## 2021-03-04 LAB
BASOPHILS # BLD: 0.1 K/MCL (ref 0–0.3)
BASOPHILS NFR BLD: 1 %
DEPRECATED RDW RBC: 46.1 FL (ref 39–50)
EOSINOPHIL # BLD: 0.3 K/MCL (ref 0–0.5)
EOSINOPHIL NFR BLD: 3 %
ERYTHROCYTE [DISTWIDTH] IN BLOOD: 15.9 % (ref 11–15)
HCT VFR BLD CALC: 40.6 % (ref 39–51)
HGB BLD-MCNC: 13 G/DL (ref 13–17)
IMM GRANULOCYTES # BLD AUTO: 0.3 K/MCL (ref 0–0.2)
IMM GRANULOCYTES # BLD: 3 %
INR PPP: 1
LYMPHOCYTES # BLD: 3 K/MCL (ref 1–4.8)
LYMPHOCYTES NFR BLD: 29 %
MCH RBC QN AUTO: 26 PG (ref 26–34)
MCHC RBC AUTO-ENTMCNC: 32 G/DL (ref 32–36.5)
MCV RBC AUTO: 81.2 FL (ref 78–100)
MONOCYTES # BLD: 0.8 K/MCL (ref 0.3–0.9)
MONOCYTES NFR BLD: 8 %
NEUTROPHILS # BLD: 5.8 K/MCL (ref 1.8–7.7)
NEUTROPHILS NFR BLD: 56 %
NRBC BLD MANUAL-RTO: 0 /100 WBC
PLATELET # BLD AUTO: 171 K/MCL (ref 140–450)
PROTHROMBIN TIME: 10.3 SEC (ref 9.7–11.8)
RBC # BLD: 5 MIL/MCL (ref 4.5–5.9)
WBC # BLD: 10.1 K/MCL (ref 4.2–11)

## 2021-03-04 PROCEDURE — 85610 PROTHROMBIN TIME: CPT | Performed by: CLINICAL MEDICAL LABORATORY

## 2021-03-04 PROCEDURE — 85025 COMPLETE CBC W/AUTO DIFF WBC: CPT | Performed by: CLINICAL MEDICAL LABORATORY

## 2021-03-04 PROCEDURE — PSEU8443 PROTHROMBIN TIME: Performed by: CLINICAL MEDICAL LABORATORY

## 2021-04-13 NOTE — ED NOTES
Glucose 426. RN notified. Doctor notified. Spoke to Pt and confirmed SP that is scheduled on 4/20/21 w/ Dr. Mac.

## 2021-06-30 NOTE — ED NOTES
[facility] ambulance called for patient transport. ETA 30 minutes. Writer spoke with Jada Austin [facility]. Aware pt will be returning to facility tonight via [facility].  Jada Austin also aware pt needs f/u with facility  No

## 2022-10-26 ENCOUNTER — LAB REQUISITION (OUTPATIENT)
Dept: LAB | Age: 32
End: 2022-10-26

## 2022-10-26 DIAGNOSIS — Z13.9 ENCOUNTER FOR SCREENING, UNSPECIFIED: ICD-10-CM

## 2022-10-26 PROCEDURE — PSEU8226 VALPROIC ACID: Performed by: CLINICAL MEDICAL LABORATORY

## 2022-10-26 PROCEDURE — 80164 ASSAY DIPROPYLACETIC ACD TOT: CPT | Performed by: CLINICAL MEDICAL LABORATORY

## 2022-10-27 LAB — VALPROATE SERPL-MCNC: 23 MCG/ML (ref 50–125)

## 2023-05-04 ENCOUNTER — LAB REQUISITION (OUTPATIENT)
Dept: LAB | Age: 33
End: 2023-05-04

## 2023-05-04 LAB
ALBUMIN SERPL-MCNC: 4.1 G/DL (ref 3.6–5.1)
ALBUMIN/GLOB SERPL: 1.2 {RATIO} (ref 1–2.4)
ALP SERPL-CCNC: 89 UNITS/L (ref 45–117)
ALT SERPL-CCNC: 57 UNITS/L
ANION GAP SERPL CALC-SCNC: 16 MMOL/L (ref 7–19)
AST SERPL-CCNC: 20 UNITS/L
BILIRUB SERPL-MCNC: 0.3 MG/DL (ref 0.2–1)
BUN SERPL-MCNC: 14 MG/DL (ref 6–20)
BUN/CREAT SERPL: 22 (ref 7–25)
CALCIUM SERPL-MCNC: 9.3 MG/DL (ref 8.4–10.2)
CHLORIDE SERPL-SCNC: 101 MMOL/L (ref 97–110)
CO2 SERPL-SCNC: 24 MMOL/L (ref 21–32)
CREAT SERPL-MCNC: 0.65 MG/DL (ref 0.67–1.17)
DEPRECATED RDW RBC: 42 FL (ref 39–50)
ERYTHROCYTE [DISTWIDTH] IN BLOOD: 15.3 % (ref 11–15)
FASTING DURATION TIME PATIENT: ABNORMAL H
GFR SERPLBLD BASED ON 1.73 SQ M-ARVRAT: >90 ML/MIN
GLOBULIN SER-MCNC: 3.3 G/DL (ref 2–4)
GLUCOSE SERPL-MCNC: 151 MG/DL (ref 70–99)
HCT VFR BLD CALC: 44 % (ref 39–51)
HGB BLD-MCNC: 14.3 G/DL (ref 13–17)
MCH RBC QN AUTO: 25.1 PG (ref 26–34)
MCHC RBC AUTO-ENTMCNC: 32.5 G/DL (ref 32–36.5)
MCV RBC AUTO: 77.2 FL (ref 78–100)
NRBC BLD MANUAL-RTO: 0 /100 WBC
PLATELET # BLD AUTO: 242 K/MCL (ref 140–450)
POTASSIUM SERPL-SCNC: 4.2 MMOL/L (ref 3.4–5.1)
PROT SERPL-MCNC: 7.4 G/DL (ref 6.4–8.2)
RBC # BLD: 5.7 MIL/MCL (ref 4.5–5.9)
SODIUM SERPL-SCNC: 137 MMOL/L (ref 135–145)
WBC # BLD: 9.2 K/MCL (ref 4.2–11)

## 2023-05-04 PROCEDURE — 85027 COMPLETE CBC AUTOMATED: CPT | Performed by: CLINICAL MEDICAL LABORATORY

## 2023-05-04 PROCEDURE — PSEU8250 COMPREHENSIVE METABOLIC PANEL: Performed by: CLINICAL MEDICAL LABORATORY

## 2023-05-04 PROCEDURE — 80053 COMPREHEN METABOLIC PANEL: CPT | Performed by: CLINICAL MEDICAL LABORATORY

## 2023-05-04 PROCEDURE — PSEU10425 CBC NO DIFFERENTIAL (PERFORMABLE ONLY): Performed by: CLINICAL MEDICAL LABORATORY

## 2023-06-22 ENCOUNTER — LAB REQUISITION (OUTPATIENT)
Dept: LAB | Age: 33
End: 2023-06-22

## 2023-06-22 LAB
ANION GAP SERPL CALC-SCNC: 16 MMOL/L (ref 7–19)
BUN SERPL-MCNC: 19 MG/DL (ref 6–20)
BUN/CREAT SERPL: 19 (ref 7–25)
CALCIUM SERPL-MCNC: 8.3 MG/DL (ref 8.4–10.2)
CHLORIDE SERPL-SCNC: 97 MMOL/L (ref 97–110)
CO2 SERPL-SCNC: 25 MMOL/L (ref 21–32)
CREAT SERPL-MCNC: 1 MG/DL (ref 0.67–1.17)
FASTING DURATION TIME PATIENT: ABNORMAL H
GFR SERPLBLD BASED ON 1.73 SQ M-ARVRAT: >90 ML/MIN
GLUCOSE SERPL-MCNC: 212 MG/DL (ref 70–99)
MAGNESIUM SERPL-MCNC: 1.6 MG/DL (ref 1.7–2.4)
PHOSPHATE SERPL-MCNC: 4.8 MG/DL (ref 2.4–4.7)
POTASSIUM SERPL-SCNC: 3.5 MMOL/L (ref 3.4–5.1)
SODIUM SERPL-SCNC: 134 MMOL/L (ref 135–145)

## 2023-06-22 PROCEDURE — PSEU8274 MAGNESIUM: Performed by: CLINICAL MEDICAL LABORATORY

## 2023-06-22 PROCEDURE — PSEU8235 BASIC METABOLIC PANEL: Performed by: CLINICAL MEDICAL LABORATORY

## 2023-06-22 PROCEDURE — PSEU8279 PHOSPHORUS: Performed by: CLINICAL MEDICAL LABORATORY

## 2023-06-22 PROCEDURE — 83735 ASSAY OF MAGNESIUM: CPT | Performed by: CLINICAL MEDICAL LABORATORY

## 2023-06-22 PROCEDURE — 80048 BASIC METABOLIC PNL TOTAL CA: CPT | Performed by: CLINICAL MEDICAL LABORATORY

## 2023-06-22 PROCEDURE — 84100 ASSAY OF PHOSPHORUS: CPT | Performed by: CLINICAL MEDICAL LABORATORY

## 2023-12-15 NOTE — ED PROVIDER NOTES
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

## 2024-05-15 NOTE — ED NOTES
Glucose 416. RN notified. Patient presents here for evaluation of symptoms of a bladder infection, including burning with urination, and urinary frequency that she first notice early this morning.          Detail Level: Simple

## 2024-12-05 NOTE — ED PROVIDER NOTES
Patient Seen in: [facility] Emergency Department    History   Patient presents with:  Spasms      HPI    Patient well-known to this facility presents from his nursing facility for diffuse muscle spasms. Patient often presents with this complaint. None (Room air)       All measures to prevent infection transmission during my interaction with the patient were taken. The patient was already wearing a droplet mask on my arrival to the room.  Personal protective equipment including droplet mask, eye prot complaints. Stable for discharge.     Condition upon leaving the department: Stable    Disposition and Plan     Clinical Impression:  Spasm of muscle  (primary encounter diagnosis)    Disposition:  Discharge    Follow-up:  Robbie Guallpa Patient lives in a private home with her , with a ramp entrance, no steps to negotiate. Everything on the first floor. Patients right lower extremity BKA (2010), left AKA (2018). Patient reports she scoots to her wheelchair independently, but does not ambulate. Patient has a sliding board. Has a right lower extremity prosthetic, but one of the sleeves induced bruising so she does not utilize it often. Was receiving outpatient PT last years. Pans on following up with vascular surgeon to adjust right prosthetic.     Patient left in bed, NAD, all lines and tubes intact, bed alarm on, call bell within reach, head of bed elevated >30 degrees, RN Carolyn aware of PT

## (undated) NOTE — ED AVS SNAPSHOT
Elida Almazan   MRN: [id number]    Department:  [facility] Emergency Department   Date of Visit:  11/20/2018           Disclosure     Insurance plans vary and the physician(s) referred by the ER may not be covered by your plan.  Please contact y CARE PHYSICIAN AT ONCE OR RETURN IMMEDIATELY TO THE EMERGENCY DEPARTMENT. If you have been prescribed any medication(s), please fill your prescription right away and begin taking the medication(s) as directed.   If you believe that any of the medications

## (undated) NOTE — ED AVS SNAPSHOT
Amina Lopez   MRN: [id number]    Department:  [facility] Emergency Department   Date of Visit:  9/1/2019           Disclosure     Insurance plans vary and the physician(s) referred by the ER may not be covered by your plan.  Please contact you CARE PHYSICIAN AT ONCE OR RETURN IMMEDIATELY TO THE EMERGENCY DEPARTMENT. If you have been prescribed any medication(s), please fill your prescription right away and begin taking the medication(s) as directed.   If you believe that any of the medications

## (undated) NOTE — LETTER
Date & Time: 12/17/2019, 8:02 AM  Patient: Gómez Alexander    Dear Gómez Munoz,    After numerous attempts, we have been unable to contact you via telephone.  ***        If you have any questions or need additional information please contact the Emergency

## (undated) NOTE — ED AVS SNAPSHOT
Kenji Varner   MRN: [id number]    Department:  [facility] Emergency Department   Date of Visit:  8/27/2019           Disclosure     Insurance plans vary and the physician(s) referred by the ER may not be covered by your plan.  Please contact yo CARE PHYSICIAN AT ONCE OR RETURN IMMEDIATELY TO THE EMERGENCY DEPARTMENT. If you have been prescribed any medication(s), please fill your prescription right away and begin taking the medication(s) as directed.   If you believe that any of the medications

## (undated) NOTE — ED AVS SNAPSHOT
Sylvia Lawson   MRN: [id number]    Department:  [facility] Emergency Department   Date of Visit:  3/17/2020           Disclosure     Insurance plans vary and the physician(s) referred by the ER may not be covered by your plan.  Please contact yo CARE PHYSICIAN AT ONCE OR RETURN IMMEDIATELY TO THE EMERGENCY DEPARTMENT. If you have been prescribed any medication(s), please fill your prescription right away and begin taking the medication(s) as directed.   If you believe that any of the medications

## (undated) NOTE — IP AVS SNAPSHOT
[facility]            (For Outpatient Use Only) Initial Admit Date: 4/29/2020   Inpt/Obs Admit Date: Inpt: 4/30/20 / Obs: N/A   Discharge Date:    Alpa Duron:  [de-identified]   MRN: [de-identified]   CSN: [id number]   CEID: [id number]        DDK Group Number:  Insurance Type:    Subscriber Name:  Subscriber :    Subscriber ID:  Pt Rel to Subscriber:    Hospital Account Financial Class: Medicare    May 2, 2020

## (undated) NOTE — ED AVS SNAPSHOT
Alanna King   MRN: [id number]    Department:  [facility] Emergency Department   Date of Visit:  6/15/2019           Disclosure     Insurance plans vary and the physician(s) referred by the ER may not be covered by your plan.  Please contact yo CARE PHYSICIAN AT ONCE OR RETURN IMMEDIATELY TO THE EMERGENCY DEPARTMENT. If you have been prescribed any medication(s), please fill your prescription right away and begin taking the medication(s) as directed.   If you believe that any of the medications

## (undated) NOTE — IP AVS SNAPSHOT
Patient Demographics     Address  [address] [place] [address] [address]  [address] [place] [address] Phone  [phone] (Home)  [phone] (Mobile) *Preferred*      Emergency Contact(s)     Name Relation Home Work Mobile    SARA ALCANTAR Brother Take 5 mg by mouth daily. Azelastine HCl 0.1 % Soln  Commonly known as:  ASTELIN  Next dose due: Tonight at bedtime      2 sprays by Nasal route 2 (two) times daily. baclofen 10 MG Tabs  Commonly known as:  LIORESAL  Next dose due:   Ton HYDROcodone-acetaminophen 5-325 MG Tabs  Commonly known as:  NORCO      Take 1 tablet by mouth every 6 (six) hours as needed for Pain.           HYDROcodone-acetaminophen 5-325 MG Tabs  Commonly known as:  NORCO      Take 1 tablet by mouth every 6 ( Notes to patient:  Remove previous patch prior to applying new patch. Place 1 patch onto the skin daily. ondansetron 4 MG Tbdp  Commonly known as:  ZOFRAN-ODT      Take 4 mg by mouth every 8 (eight) hours as needed for Nausea.           Yoselynos [id number] 0.9% NaCl infusion 05/01/20 1704 New Bag      [id number] 0.9% NaCl infusion 05/02/20 0659 New Bag      [id number] Fluticasone Propionate (FLONASE) 50 MCG/ACT nasal spray 1 spray 05/02/20 1006 Given      [id number] HYDROcodone-acetaminophen ([address]) [id number] artificial tears 83-15 % ophthalmic ointment 1 Application 32/62/33 9904 Given      [id number] artificial tears 83-15 % ophthalmic ointment 1 Application 75/89/00 9819 Given            LEFT LOWER ABDOMEN     Order ID Medication Name Action Time A Specimen:  Other from Nares      MRSA Screen By PCR Negative    Rapid SARS-CoV-2 by PCR STAT [[id number]]  (Normal) Collected:  04/30/20 1825    Order Status:  Completed Lab Status:  Final result Updated:  04/30/20 1853    Specimen:  Other from Nares • Major depressive disorder    • Muscle weakness    • Seizure disorder (HCC)    • TBI (traumatic brain injury) ([facility] [address]      History reviewed. No pertinent surgical history. History reviewed. No pertinent family history.   Social History:  Social History    T HYDROcodone-acetaminophen 5-325 MG Oral Tab, Take 1 tablet by mouth every 6 (six) hours as needed for Pain. ARIpiprazole 5 MG Oral Tab, Take 5 mg by mouth daily. acetaminophen 325 MG Oral Tab, Take 650 mg by mouth every 4 (four) hours as needed for Pain. Pulmonary:  clear to auscultation bilaterally  Cardiovascular: S1, S2 normal  Abdominal: soft, non-tender; bowel sounds normal; no masses,  no organomegaly  Extremities: extremities normal, atraumatic, no cyanosis or edema[CL. 2]        Results:     Lab Res Filed:  4/30/2020  7:30 PM Date of Service:  4/30/2020 11:15 AM Status:  Signed    : Izabella Gamboa MD (Physician)     Consult Orders    1.  Inpatient consult to PsychIATRY [[id number]] ordered by Delio Lorenz MD at 04/30/20 9578 hopeless and he finds himself no one has been talking to him and he has been feeling very lonely.   Patient admitted that he has been having suicidal ideation for a while and he was searching on his phone because the only medication axis he had next to him Social History[GA. 1]  Social History    Tobacco Use      Smoking status: Former Smoker      Smokeless tobacco: Former User    Alcohol use: Not Currently    Drug use: Never[GA.2]          Current Medications:[GA.1]  nicotine (NICODERM CQ) 21 MG/24HR 1 patch The patient had active suicidal ideation with attempt of overdose. Patient denying any auditory or visual hallucination. Patient with tendency to have severe impulsivity. Cognition presented fairly appropriate for his medical condition.   Judgment and in Requested Prescriptions      No prescriptions requested or ordered in this encounter[GA. 2]           Roland Kidd MD  4/30/2020[GA.1]        Electronically signed by Tammy Schneider MD on 4/30/2020  7:30 PM   Attribution Douglass    GA. 1 - Tammy Schneider

## (undated) NOTE — ED AVS SNAPSHOT
Alanna King   MRN: [id number]    Department:  [facility] Emergency Department   Date of Visit:  3/4/2020           Disclosure     Insurance plans vary and the physician(s) referred by the ER may not be covered by your plan.  Please contact you CARE PHYSICIAN AT ONCE OR RETURN IMMEDIATELY TO THE EMERGENCY DEPARTMENT. If you have been prescribed any medication(s), please fill your prescription right away and begin taking the medication(s) as directed.   If you believe that any of the medications

## (undated) NOTE — ED AVS SNAPSHOT
Radha Claudio   MRN: [id number]    Department:  [facility] Emergency Department   Date of Visit:  5/19/2019           Disclosure     Insurance plans vary and the physician(s) referred by the ER may not be covered by your plan.  Please contact yo CARE PHYSICIAN AT ONCE OR RETURN IMMEDIATELY TO THE EMERGENCY DEPARTMENT. If you have been prescribed any medication(s), please fill your prescription right away and begin taking the medication(s) as directed.   If you believe that any of the medications

## (undated) NOTE — ED AVS SNAPSHOT
Mata Matta   MRN: [id number]    Department:  [facility] Emergency Department   Date of Visit:  6/24/2019           Disclosure     Insurance plans vary and the physician(s) referred by the ER may not be covered by your plan.  Please contact yo CARE PHYSICIAN AT ONCE OR RETURN IMMEDIATELY TO THE EMERGENCY DEPARTMENT. If you have been prescribed any medication(s), please fill your prescription right away and begin taking the medication(s) as directed.   If you believe that any of the medications

## (undated) NOTE — ED AVS SNAPSHOT
Buddie Soulier   MRN: [id number]    Department:  [facility] Emergency Department   Date of Visit:  10/16/2019           Disclosure     Insurance plans vary and the physician(s) referred by the ER may not be covered by your plan.  Please contact y CARE PHYSICIAN AT ONCE OR RETURN IMMEDIATELY TO THE EMERGENCY DEPARTMENT. If you have been prescribed any medication(s), please fill your prescription right away and begin taking the medication(s) as directed.   If you believe that any of the medications

## (undated) NOTE — ED AVS SNAPSHOT
Milan Miner   MRN: [id number]    Department:  [facility] Emergency Department   Date of Visit:  3/24/2019           Disclosure     Insurance plans vary and the physician(s) referred by the ER may not be covered by your plan.  Please contact yo CARE PHYSICIAN AT ONCE OR RETURN IMMEDIATELY TO THE EMERGENCY DEPARTMENT. If you have been prescribed any medication(s), please fill your prescription right away and begin taking the medication(s) as directed.   If you believe that any of the medications

## (undated) NOTE — ED AVS SNAPSHOT
Kenji Varner   MRN: [id number]    Department:  [facility] Emergency Department   Date of Visit:  10/19/2019           Disclosure     Insurance plans vary and the physician(s) referred by the ER may not be covered by your plan.  Please contact y CARE PHYSICIAN AT ONCE OR RETURN IMMEDIATELY TO THE EMERGENCY DEPARTMENT. If you have been prescribed any medication(s), please fill your prescription right away and begin taking the medication(s) as directed.   If you believe that any of the medications

## (undated) NOTE — ED AVS SNAPSHOT
Ching Garcia   MRN: [id number]    Department:  [facility] Emergency Department   Date of Visit:  9/5/2019           Disclosure     Insurance plans vary and the physician(s) referred by the ER may not be covered by your plan.  Please contact you CARE PHYSICIAN AT ONCE OR RETURN IMMEDIATELY TO THE EMERGENCY DEPARTMENT. If you have been prescribed any medication(s), please fill your prescription right away and begin taking the medication(s) as directed.   If you believe that any of the medications

## (undated) NOTE — ED AVS SNAPSHOT
Yuridia Mckay   MRN: [id number]    Department:  [facility] Emergency Department   Date of Visit:  3/23/2019           Disclosure     Insurance plans vary and the physician(s) referred by the ER may not be covered by your plan.  Please contact yo CARE PHYSICIAN AT ONCE OR RETURN IMMEDIATELY TO THE EMERGENCY DEPARTMENT. If you have been prescribed any medication(s), please fill your prescription right away and begin taking the medication(s) as directed.   If you believe that any of the medications

## (undated) NOTE — ED AVS SNAPSHOT
Antelmo Corey   MRN: [id number]    Department:  [facility] Emergency Department   Date of Visit:  12/27/2019           Disclosure     Insurance plans vary and the physician(s) referred by the ER may not be covered by your plan.  Please contact y CARE PHYSICIAN AT ONCE OR RETURN IMMEDIATELY TO THE EMERGENCY DEPARTMENT. If you have been prescribed any medication(s), please fill your prescription right away and begin taking the medication(s) as directed.   If you believe that any of the medications

## (undated) NOTE — ED AVS SNAPSHOT
Janet Kinsey   MRN: [id number]    Department:  [facility] Emergency Department   Date of Visit:  3/1/2020           Disclosure     Insurance plans vary and the physician(s) referred by the ER may not be covered by your plan.  Please contact you CARE PHYSICIAN AT ONCE OR RETURN IMMEDIATELY TO THE EMERGENCY DEPARTMENT. If you have been prescribed any medication(s), please fill your prescription right away and begin taking the medication(s) as directed.   If you believe that any of the medications

## (undated) NOTE — ED AVS SNAPSHOT
Arian Ferrer   MRN: [id number]    Department:  [facility] Emergency Department   Date of Visit:  3/19/2020           Disclosure     Insurance plans vary and the physician(s) referred by the ER may not be covered by your plan.  Please contact yo within the next three months to obtain basic health screening including reassessment of your blood pressure.     IF THERE IS ANY CHANGE OR WORSENING OF YOUR CONDITION, CALL YOUR PRIMARY CARE PHYSICIAN AT ONCE OR RETURN IMMEDIATELY TO THE EMERGENCY DEPARTMEN

## (undated) NOTE — LETTER
Date & Time: 12/17/2019, 8:00 AM  Patient: Olivia Guallpaa    Dear Olivia Coronado,    We are unable to reach you by phone and would like to follow up with you regarding your visit to the Emergency Department.         Please contact the Emergency Department c

## (undated) NOTE — ED AVS SNAPSHOT
Satish Mejia   MRN: [id number]    Department:  [facility] Emergency Department   Date of Visit:  11/10/2019           Disclosure     Insurance plans vary and the physician(s) referred by the ER may not be covered by your plan.  Please contact y CARE PHYSICIAN AT ONCE OR RETURN IMMEDIATELY TO THE EMERGENCY DEPARTMENT. If you have been prescribed any medication(s), please fill your prescription right away and begin taking the medication(s) as directed.   If you believe that any of the medications

## (undated) NOTE — ED AVS SNAPSHOT
Rony Prakash   MRN: [id number]    Department:  [facility] Emergency Department   Date of Visit:  10/3/2019           Disclosure     Insurance plans vary and the physician(s) referred by the ER may not be covered by your plan.  Please contact yo CARE PHYSICIAN AT ONCE OR RETURN IMMEDIATELY TO THE EMERGENCY DEPARTMENT. If you have been prescribed any medication(s), please fill your prescription right away and begin taking the medication(s) as directed.   If you believe that any of the medications

## (undated) NOTE — ED AVS SNAPSHOT
Lyle Tomlinson   MRN: [id number]    Department:  [facility] Emergency Department   Date of Visit:  8/18/2019           Disclosure     Insurance plans vary and the physician(s) referred by the ER may not be covered by your plan.  Please contact yo CARE PHYSICIAN AT ONCE OR RETURN IMMEDIATELY TO THE EMERGENCY DEPARTMENT. If you have been prescribed any medication(s), please fill your prescription right away and begin taking the medication(s) as directed.   If you believe that any of the medications

## (undated) NOTE — ED AVS SNAPSHOT
Mata Matta   MRN: [id number]    Department:  [facility] Emergency Department   Date of Visit:  7/2/2019           Disclosure     Insurance plans vary and the physician(s) referred by the ER may not be covered by your plan.  Please contact you CARE PHYSICIAN AT ONCE OR RETURN IMMEDIATELY TO THE EMERGENCY DEPARTMENT. If you have been prescribed any medication(s), please fill your prescription right away and begin taking the medication(s) as directed.   If you believe that any of the medications

## (undated) NOTE — ED AVS SNAPSHOT
Cande Crooks   MRN: [id number]    Department:  [facility] Emergency Department   Date of Visit:  6/15/2019           Disclosure     Insurance plans vary and the physician(s) referred by the ER may not be covered by your plan.  Please contact yo CARE PHYSICIAN AT ONCE OR RETURN IMMEDIATELY TO THE EMERGENCY DEPARTMENT. If you have been prescribed any medication(s), please fill your prescription right away and begin taking the medication(s) as directed.   If you believe that any of the medications

## (undated) NOTE — ED AVS SNAPSHOT
Buddie Soulier   MRN: [id number]    Department:  [facility] Emergency Department   Date of Visit:  9/3/2019           Disclosure     Insurance plans vary and the physician(s) referred by the ER may not be covered by your plan.  Please contact you CARE PHYSICIAN AT ONCE OR RETURN IMMEDIATELY TO THE EMERGENCY DEPARTMENT. If you have been prescribed any medication(s), please fill your prescription right away and begin taking the medication(s) as directed.   If you believe that any of the medications

## (undated) NOTE — ED AVS SNAPSHOT
Satish Mejia   MRN: [id number]    Department:  [facility] Emergency Department   Date of Visit:  11/20/2019           Disclosure     Insurance plans vary and the physician(s) referred by the ER may not be covered by your plan.  Please contact y CARE PHYSICIAN AT ONCE OR RETURN IMMEDIATELY TO THE EMERGENCY DEPARTMENT. If you have been prescribed any medication(s), please fill your prescription right away and begin taking the medication(s) as directed.   If you believe that any of the medications

## (undated) NOTE — ED AVS SNAPSHOT
Ching Garcia   MRN: [id number]    Department:  [facility] Emergency Department   Date of Visit:  9/29/2019           Disclosure     Insurance plans vary and the physician(s) referred by the ER may not be covered by your plan.  Please contact yo CARE PHYSICIAN AT ONCE OR RETURN IMMEDIATELY TO THE EMERGENCY DEPARTMENT. If you have been prescribed any medication(s), please fill your prescription right away and begin taking the medication(s) as directed.   If you believe that any of the medications

## (undated) NOTE — ED AVS SNAPSHOT
Arian Ferrer   MRN: [id number]    Department:  [facility] Emergency Department   Date of Visit:  6/17/2019           Disclosure     Insurance plans vary and the physician(s) referred by the ER may not be covered by your plan.  Please contact yo CARE PHYSICIAN AT ONCE OR RETURN IMMEDIATELY TO THE EMERGENCY DEPARTMENT. If you have been prescribed any medication(s), please fill your prescription right away and begin taking the medication(s) as directed.   If you believe that any of the medications

## (undated) NOTE — ED AVS SNAPSHOT
Radha Claudio   MRN: [id number]    Department:  [facility] Emergency Department   Date of Visit:  8/2/2019           Disclosure     Insurance plans vary and the physician(s) referred by the ER may not be covered by your plan.  Please contact you CARE PHYSICIAN AT ONCE OR RETURN IMMEDIATELY TO THE EMERGENCY DEPARTMENT. If you have been prescribed any medication(s), please fill your prescription right away and begin taking the medication(s) as directed.   If you believe that any of the medications

## (undated) NOTE — ED AVS SNAPSHOT
Marycarmen Castro   MRN: [id number]    Department:  [facility] Emergency Department   Date of Visit:  6/21/2019           Disclosure     Insurance plans vary and the physician(s) referred by the ER may not be covered by your plan.  Please contact yo CARE PHYSICIAN AT ONCE OR RETURN IMMEDIATELY TO THE EMERGENCY DEPARTMENT. If you have been prescribed any medication(s), please fill your prescription right away and begin taking the medication(s) as directed.   If you believe that any of the medications

## (undated) NOTE — ED AVS SNAPSHOT
Olivia Coronado   MRN: [id number]    Department:  [facility] Emergency Department   Date of Visit:  10/8/2019           Disclosure     Insurance plans vary and the physician(s) referred by the ER may not be covered by your plan.  Please contact yo CARE PHYSICIAN AT ONCE OR RETURN IMMEDIATELY TO THE EMERGENCY DEPARTMENT. If you have been prescribed any medication(s), please fill your prescription right away and begin taking the medication(s) as directed.   If you believe that any of the medications

## (undated) NOTE — ED AVS SNAPSHOT
Marycarmen Castro   MRN: [id number]    Department:  [facility] Emergency Department   Date of Visit:  5/12/2019           Disclosure     Insurance plans vary and the physician(s) referred by the ER may not be covered by your plan.  Please contact yo CARE PHYSICIAN AT ONCE OR RETURN IMMEDIATELY TO THE EMERGENCY DEPARTMENT. If you have been prescribed any medication(s), please fill your prescription right away and begin taking the medication(s) as directed.   If you believe that any of the medications

## (undated) NOTE — ED AVS SNAPSHOT
Olivia Coronado   MRN: [id number]    Department:  [facility] Emergency Department   Date of Visit:  12/10/2019           Disclosure     Insurance plans vary and the physician(s) referred by the ER may not be covered by your plan.  Please contact y CARE PHYSICIAN AT ONCE OR RETURN IMMEDIATELY TO THE EMERGENCY DEPARTMENT. If you have been prescribed any medication(s), please fill your prescription right away and begin taking the medication(s) as directed.   If you believe that any of the medications

## (undated) NOTE — ED AVS SNAPSHOT
Charlie Armstrong   MRN: [id number]    Department:  [facility] Emergency Department   Date of Visit:  10/3/2019           Disclosure     Insurance plans vary and the physician(s) referred by the ER may not be covered by your plan.  Please contact yo CARE PHYSICIAN AT ONCE OR RETURN IMMEDIATELY TO THE EMERGENCY DEPARTMENT. If you have been prescribed any medication(s), please fill your prescription right away and begin taking the medication(s) as directed.   If you believe that any of the medications

## (undated) NOTE — ED AVS SNAPSHOT
Javier Mathew   MRN: [id number]    Department:  [facility] Emergency Department   Date of Visit:  10/7/2019           Disclosure     Insurance plans vary and the physician(s) referred by the ER may not be covered by your plan.  Please contact yo CARE PHYSICIAN AT ONCE OR RETURN IMMEDIATELY TO THE EMERGENCY DEPARTMENT. If you have been prescribed any medication(s), please fill your prescription right away and begin taking the medication(s) as directed.   If you believe that any of the medications

## (undated) NOTE — ED AVS SNAPSHOT
Opal Guo   MRN: [id number]    Department:  [facility] Emergency Department   Date of Visit:  12/4/2019           Disclosure     Insurance plans vary and the physician(s) referred by the ER may not be covered by your plan.  Please contact yo CARE PHYSICIAN AT ONCE OR RETURN IMMEDIATELY TO THE EMERGENCY DEPARTMENT. If you have been prescribed any medication(s), please fill your prescription right away and begin taking the medication(s) as directed.   If you believe that any of the medications

## (undated) NOTE — ED AVS SNAPSHOT
Charlie Armstrong   MRN: [id number]    Department:  [facility] Emergency Department   Date of Visit:  6/1/2019           Disclosure     Insurance plans vary and the physician(s) referred by the ER may not be covered by your plan.  Please contact you CARE PHYSICIAN AT ONCE OR RETURN IMMEDIATELY TO THE EMERGENCY DEPARTMENT. If you have been prescribed any medication(s), please fill your prescription right away and begin taking the medication(s) as directed.   If you believe that any of the medications

## (undated) NOTE — ED AVS SNAPSHOT
Marycarmen Castro   MRN: [id number]    Department:  [facility] Emergency Department   Date of Visit:  6/8/2019           Disclosure     Insurance plans vary and the physician(s) referred by the ER may not be covered by your plan.  Please contact you CARE PHYSICIAN AT ONCE OR RETURN IMMEDIATELY TO THE EMERGENCY DEPARTMENT. If you have been prescribed any medication(s), please fill your prescription right away and begin taking the medication(s) as directed.   If you believe that any of the medications

## (undated) NOTE — ED AVS SNAPSHOT
Milan Sridharin   MRN: [id number]    Department:  [facility] Emergency Department   Date of Visit:  3/10/2020           Disclosure     Insurance plans vary and the physician(s) referred by the ER may not be covered by your plan.  Please contact yo CARE PHYSICIAN AT ONCE OR RETURN IMMEDIATELY TO THE EMERGENCY DEPARTMENT. If you have been prescribed any medication(s), please fill your prescription right away and begin taking the medication(s) as directed.   If you believe that any of the medications